# Patient Record
Sex: FEMALE | Race: WHITE | Employment: FULL TIME | URBAN - METROPOLITAN AREA
[De-identification: names, ages, dates, MRNs, and addresses within clinical notes are randomized per-mention and may not be internally consistent; named-entity substitution may affect disease eponyms.]

---

## 2017-05-10 ENCOUNTER — HOSPITAL ENCOUNTER (OUTPATIENT)
Dept: ULTRASOUND IMAGING | Age: 22
Discharge: HOME | End: 2017-05-10
Attending: PEDIATRICS
Payer: COMMERCIAL

## 2017-05-10 DIAGNOSIS — R33.9 URINARY RETENTION: ICD-10-CM

## 2017-05-10 PROCEDURE — 76775 US EXAM ABDO BACK WALL LIM: CPT | Performed by: PEDIATRICS

## 2018-06-13 PROBLEM — F33.9 MAJOR DEPRESSIVE DISORDER, RECURRENT, UNSPECIFIED (HCC): Status: ACTIVE | Noted: 2018-06-13

## 2018-06-13 NOTE — ED NOTES
Nadia Aquino of [facility] now to bedside to evaluate patient.

## 2018-06-13 NOTE — ED INITIAL ASSESSMENT (HPI)
Pt went to see therapist, had no appointment, was not able to see therapist today. Pt produces screen shots on her phone outlining stress with possible new job beginning, death in the family, recent panic attack after move to [place].  +Recent diagnosis of

## 2018-06-14 NOTE — BH LEVEL OF CARE ASSESSMENT
Level of Care Assessment Note    General Questions  Why are you here?: PT IS A PLEASANT 23 YR OLD FEMALE WHO ARRIVES TO THE [facility] VIA AMBULANCE FOR A CRISIS MENTAL HEALTH EVAL. PT REPORTS SUICIDAL THOUGHTS ON A DAILY BAISIS x 5/1/2018.   REPORTS THOUGH HYPERVENTILATING & CRYING.  ENDED UP ON GROUND. TAKEN AWAY ON STRETCHER TO THE ER. AFTER THAT COULD BARELY WORK. KEPT HAVING PANIC ATTACKS.   COULDN'T TOLERATE BEING IN THE AREA IN WHICH SHE HAD THE SEVERE PANIC ATTACK.  1 SUPERVISOR WASN'T SUPPORTIVE @ OF WORTHLESSNESS & THOUGHTS OF IT BEING EASIER IF HE WEREN'T ALIVE. ADMITTED TO THOUGHTS OF \"MAYBE I SHOULD JUST KILL MYSELF\". TOOK AN UBER TO THERAPY APPT TODAY D/T MOM'S CONCERNS ABOUT HER DRIVING. PT W/ A LONG HX OF ANXIETY & SENSORY ISSUES.   EASI months  Score -  OV: 10 - High Risk   Is your experience of thoughts of dying by suicide: Comforting  Protective Factors: SUPPORTIVE FAMILY  Past Suicidal Ideation: Method/Plan  Family History or Personal Lived Experience of Loss or Near Loss by Suicide: Symptoms: Impulsivity; Increased pleasure seeking;Labile  Bipolar Description: RECENT IMPULSIVE SEXUAL ENCOUNTER. IMPULSIVELY DECIDED TO DRINK WINE THIS AFTERNOON. WORRIED ABOUT POSSIBLE BIPOLAR D/O D/T MOOD SWINGS.     Sleep Pattern: Disturbed/interrupted SCREEN + FOR RX'D AMPHETANINE ONLY )    Cannabis Use  Age at first use?: CANNABIS ===  1st / ONLY USE:  2 DAYS AGO  Route: Smoked  Average current amount used? : STATES FRIEND ALLOWED HER TO HAVE ONLY 1 HIT mood  Range of Affect: Normal  Stability of Affect: Stable  Attitude toward staff: Friendly;Pleasant; Co-operative;Open;Warm  Speech  Rate of Speech: Appropriate  Flow of Speech: Appropriate  Intensity of Volume: Ordinary  Clarity: Clear  Cognition  Concent Change  Motivational Stage of Change: Action    Level of Care Recommendations  Consulted with: Kacy Paul MD  Level of Care Recommendation: Inpatient Acute Care  Unit: Adult  Reason for Unit Assigned: A-1 [facility]  Inpatient Criteria: Suicidal/homicidal ris

## 2018-06-14 NOTE — ED PROVIDER NOTES
Patient Seen in: [facility] Emergency Department    History   Patient presents with:   Anxiety/Panic attack (neurologic)    Stated Complaint: anxiety    HPI  CHIEF COMPLAINT: Suicidal ideations    HISTORY OF PRESENT ILLNESS: Patient is a 42-year-old Smokeless tobacco: Never Used                      Alcohol use: No                Review of Systems    Positive for stated complaint: anxiety  Other systems are as noted in HPI. Constitutional and vital signs reviewed.       All other system Positive (*)     All other components within normal limits    Narrative:     Results of the Urine Drug Screen should be used only for medical purposes.    URINALYSIS WITH CULTURE REFLEX - Abnormal; Notable for the following:     Blood Urine Small (*)     Melony Simon

## 2018-06-14 NOTE — ED PROVIDER NOTES
74-year-old female presents to the emergency department secondary to having an involuntary petition completed a outpatient [facility] site and for probable admission. She was seen by me as well as by the EPP for suicidal ideations.   She was medically agustin

## 2018-06-28 ENCOUNTER — OFFICE VISIT (OUTPATIENT)
Dept: FAMILY MEDICINE CLINIC | Facility: CLINIC | Age: 23
End: 2018-06-28

## 2018-06-28 VITALS
HEIGHT: 68.5 IN | SYSTOLIC BLOOD PRESSURE: 122 MMHG | OXYGEN SATURATION: 98 % | RESPIRATION RATE: 16 BRPM | BODY MASS INDEX: 25.39 KG/M2 | WEIGHT: 169.5 LBS | HEART RATE: 93 BPM | DIASTOLIC BLOOD PRESSURE: 70 MMHG | TEMPERATURE: 98 F

## 2018-06-28 DIAGNOSIS — D64.9 ANEMIA, UNSPECIFIED TYPE: ICD-10-CM

## 2018-06-28 DIAGNOSIS — M54.6 ACUTE BILATERAL THORACIC BACK PAIN: ICD-10-CM

## 2018-06-28 DIAGNOSIS — F33.1 MODERATE EPISODE OF RECURRENT MAJOR DEPRESSIVE DISORDER (HCC): ICD-10-CM

## 2018-06-28 DIAGNOSIS — R53.83 FATIGUE, UNSPECIFIED TYPE: ICD-10-CM

## 2018-06-28 DIAGNOSIS — Z76.89 ESTABLISHING CARE WITH NEW DOCTOR, ENCOUNTER FOR: ICD-10-CM

## 2018-06-28 DIAGNOSIS — Z13.21 ENCOUNTER FOR VITAMIN DEFICIENCY SCREENING: ICD-10-CM

## 2018-06-28 DIAGNOSIS — R11.2 NAUSEA AND VOMITING, INTRACTABILITY OF VOMITING NOT SPECIFIED, UNSPECIFIED VOMITING TYPE: Primary | ICD-10-CM

## 2018-06-28 DIAGNOSIS — K21.9 GASTROESOPHAGEAL REFLUX DISEASE, ESOPHAGITIS PRESENCE NOT SPECIFIED: ICD-10-CM

## 2018-06-28 PROCEDURE — 83550 IRON BINDING TEST: CPT | Performed by: FAMILY MEDICINE

## 2018-06-28 PROCEDURE — 36415 COLL VENOUS BLD VENIPUNCTURE: CPT | Performed by: FAMILY MEDICINE

## 2018-06-28 PROCEDURE — 83540 ASSAY OF IRON: CPT | Performed by: FAMILY MEDICINE

## 2018-06-28 PROCEDURE — 84443 ASSAY THYROID STIM HORMONE: CPT | Performed by: FAMILY MEDICINE

## 2018-06-28 PROCEDURE — 82728 ASSAY OF FERRITIN: CPT | Performed by: FAMILY MEDICINE

## 2018-06-28 PROCEDURE — 82607 VITAMIN B-12: CPT | Performed by: FAMILY MEDICINE

## 2018-06-28 PROCEDURE — 99204 OFFICE O/P NEW MOD 45 MIN: CPT | Performed by: FAMILY MEDICINE

## 2018-06-28 PROCEDURE — 82306 VITAMIN D 25 HYDROXY: CPT | Performed by: FAMILY MEDICINE

## 2018-06-28 RX ORDER — ONDANSETRON 4 MG/1
4 TABLET, FILM COATED ORAL EVERY 8 HOURS PRN
Qty: 20 TABLET | Refills: 0 | Status: SHIPPED | OUTPATIENT
Start: 2018-06-28 | End: 2018-08-01 | Stop reason: ALTCHOICE

## 2018-06-28 NOTE — PROGRESS NOTES
HPI:    Patient ID: Janneth Dia is a 26year old female. HPI   23yr old female presents as a new patient with c/o n/v, f/u on anemia, and back pain. C/o n/v x 2d, felt nauseated all day on Tues. , 6/26 and had an episode of nb, nb emesis yesterday. Melatonin 5 MG Oral Tab Take 5 mg by mouth nightly as needed. Disp:  Rfl:    Ibuprofen (MIDOL) 200 MG Oral Cap Take 600 mg by mouth 2 (two) times daily as needed.  Disp:  Rfl:    Norethin Ace-Eth Estrad-FE 1-20 MG-MCG Oral Tab Take 1 tablet by mouth daily disorder ([facility]  - denies anhedonia, SI/HI  - cpm  - cont outpatient day program at [facility]  - monitor for any relapse in symptoms    4. Fatigue, unspecified  5. Anemia, unspecified type  - check iron studies    6.  Acute bilateral thoracic back pain  - advised

## 2018-07-02 ENCOUNTER — TELEPHONE (OUTPATIENT)
Dept: FAMILY MEDICINE CLINIC | Facility: CLINIC | Age: 23
End: 2018-07-02

## 2018-07-02 NOTE — TELEPHONE ENCOUNTER
----- Message from [facility], DO sent at 7/1/2018 11:03 PM CDT -----  Pls inform pt that labs all nl except continued low vit d levels. Advise to take vit d3 2000u daily otc.

## 2018-08-01 ENCOUNTER — OFFICE VISIT (OUTPATIENT)
Dept: FAMILY MEDICINE CLINIC | Facility: CLINIC | Age: 23
End: 2018-08-01
Payer: COMMERCIAL

## 2018-08-01 VITALS
OXYGEN SATURATION: 98 % | DIASTOLIC BLOOD PRESSURE: 70 MMHG | RESPIRATION RATE: 16 BRPM | HEIGHT: 68.5 IN | HEART RATE: 73 BPM | BODY MASS INDEX: 26.41 KG/M2 | SYSTOLIC BLOOD PRESSURE: 122 MMHG | TEMPERATURE: 98 F | WEIGHT: 176.25 LBS

## 2018-08-01 DIAGNOSIS — K29.00 ACUTE GASTRITIS WITHOUT HEMORRHAGE, UNSPECIFIED GASTRITIS TYPE: ICD-10-CM

## 2018-08-01 DIAGNOSIS — K21.9 GASTROESOPHAGEAL REFLUX DISEASE, ESOPHAGITIS PRESENCE NOT SPECIFIED: Primary | ICD-10-CM

## 2018-08-01 DIAGNOSIS — F33.9 EPISODE OF RECURRENT MAJOR DEPRESSIVE DISORDER, UNSPECIFIED DEPRESSION EPISODE SEVERITY (HCC): ICD-10-CM

## 2018-08-01 DIAGNOSIS — F41.1 GENERALIZED ANXIETY DISORDER: ICD-10-CM

## 2018-08-01 PROBLEM — J02.0 STREPTOCOCCAL SORE THROAT: Status: ACTIVE | Noted: 2017-09-11

## 2018-08-01 PROCEDURE — 99214 OFFICE O/P EST MOD 30 MIN: CPT | Performed by: FAMILY MEDICINE

## 2018-08-01 RX ORDER — CLINDAMYCIN AND BENZOYL PEROXIDE 10; 50 MG/G; MG/G
GEL TOPICAL
COMMUNITY
Start: 2010-09-27 | End: 2018-08-20 | Stop reason: ALTCHOICE

## 2018-08-01 RX ORDER — OMEPRAZOLE 40 MG/1
40 CAPSULE, DELAYED RELEASE ORAL
Qty: 30 CAPSULE | Refills: 0 | Status: SHIPPED | OUTPATIENT
Start: 2018-08-01 | End: 2018-08-20 | Stop reason: ALTCHOICE

## 2018-08-01 RX ORDER — BUPROPION HYDROCHLORIDE 150 MG/1
300 TABLET ORAL DAILY
Status: ON HOLD | COMMUNITY
Start: 2018-07-31 | End: 2019-08-29

## 2018-08-01 NOTE — PROGRESS NOTES
HPI:    Patient ID: Brock Miller is a 26year old female. HPI   23yr old female presents with c/o epigastric abdominal pain and heartburn since last night. States her sister put Farshad Enriquez in her coffee last night as a prank and she drank it.  Has been ha Rfl: 0   ClonazePAM 0.5 MG Oral Tab Take 0.5 mg by mouth 2 (two) times daily. Disp:  Rfl:    ClonazePAM 0.5 MG Oral Tab Take 0.5 mg by mouth daily as needed for Anxiety.  Disp:  Rfl:    Ibuprofen (MIDOL) 200 MG Oral Cap Take 600 mg by mouth 2 (two) times (40 mg total) by mouth every morning before breakfast.  Dispense: 30 capsule; Refill: 0    3. Episode of recurrent major depressive disorder, unspecified depression episode severity ([facility]  4.  Generalized anxiety disorder  - following with psychiatrist and

## 2018-08-03 PROCEDURE — 88175 CYTOPATH C/V AUTO FLUID REDO: CPT | Performed by: OBSTETRICS & GYNECOLOGY

## 2018-08-19 ENCOUNTER — HOSPITAL ENCOUNTER (EMERGENCY)
Facility: HOSPITAL | Age: 23
Discharge: HOME | End: 2018-08-19
Attending: EMERGENCY MEDICINE
Payer: COMMERCIAL

## 2018-08-19 ENCOUNTER — APPOINTMENT (OUTPATIENT)
Dept: CT IMAGING | Facility: HOSPITAL | Age: 23
End: 2018-08-19
Attending: EMERGENCY MEDICINE
Payer: COMMERCIAL

## 2018-08-19 VITALS
HEART RATE: 77 BPM | DIASTOLIC BLOOD PRESSURE: 79 MMHG | WEIGHT: 175 LBS | HEIGHT: 67 IN | OXYGEN SATURATION: 98 % | SYSTOLIC BLOOD PRESSURE: 115 MMHG | TEMPERATURE: 98 F | RESPIRATION RATE: 18 BRPM | BODY MASS INDEX: 27.47 KG/M2

## 2018-08-19 DIAGNOSIS — R10.9 ABDOMINAL PAIN, RIGHT LATERAL: Primary | ICD-10-CM

## 2018-08-19 LAB
ALBUMIN SERPL-MCNC: 3.7 G/DL (ref 3.5–4.8)
ALBUMIN/GLOB SERPL: 0.9 {RATIO} (ref 1–2)
ALP LIVER SERPL-CCNC: 86 U/L (ref 52–144)
ALT SERPL-CCNC: 25 U/L (ref 14–54)
ANION GAP SERPL CALC-SCNC: 8 MMOL/L (ref 0–18)
AST SERPL-CCNC: 15 U/L (ref 15–41)
BASOPHILS # BLD AUTO: 0.02 X10(3) UL (ref 0–0.1)
BASOPHILS NFR BLD AUTO: 0.3 %
BILIRUB SERPL-MCNC: 0.2 MG/DL (ref 0.1–2)
BILIRUB UR QL STRIP.AUTO: NEGATIVE
BUN BLD-MCNC: 10 MG/DL (ref 8–20)
BUN/CREAT SERPL: 14.5 (ref 10–20)
CALCIUM BLD-MCNC: 8.5 MG/DL (ref 8.3–10.3)
CHLORIDE SERPL-SCNC: 108 MMOL/L (ref 101–111)
CLARITY UR REFRACT.AUTO: CLEAR
CO2 SERPL-SCNC: 23 MMOL/L (ref 22–32)
COLOR UR AUTO: YELLOW
CREAT BLD-MCNC: 0.69 MG/DL (ref 0.55–1.02)
EOSINOPHIL # BLD AUTO: 0.13 X10(3) UL (ref 0–0.3)
EOSINOPHIL NFR BLD AUTO: 2.1 %
ERYTHROCYTE [DISTWIDTH] IN BLOOD BY AUTOMATED COUNT: 12.1 % (ref 11.5–16)
GLOBULIN PLAS-MCNC: 4 G/DL (ref 2.5–4)
GLUCOSE BLD-MCNC: 91 MG/DL (ref 70–99)
GLUCOSE UR STRIP.AUTO-MCNC: NEGATIVE MG/DL
HCT VFR BLD AUTO: 36.7 % (ref 34–50)
HGB BLD-MCNC: 12.5 G/DL (ref 12–16)
IMMATURE GRANULOCYTE COUNT: 0.01 X10(3) UL (ref 0–1)
IMMATURE GRANULOCYTE RATIO %: 0.2 %
KETONES UR STRIP.AUTO-MCNC: NEGATIVE MG/DL
LYMPHOCYTES # BLD AUTO: 2.11 X10(3) UL (ref 0.9–4)
LYMPHOCYTES NFR BLD AUTO: 33.9 %
M PROTEIN MFR SERPL ELPH: 7.7 G/DL (ref 6.1–8.3)
MCH RBC QN AUTO: 31.9 PG (ref 27–33.2)
MCHC RBC AUTO-ENTMCNC: 34.1 G/DL (ref 31–37)
MCV RBC AUTO: 93.6 FL (ref 81–100)
MONOCYTES # BLD AUTO: 0.71 X10(3) UL (ref 0.1–1)
MONOCYTES NFR BLD AUTO: 11.4 %
NEUTROPHIL ABS PRELIM: 3.24 X10 (3) UL (ref 1.3–6.7)
NEUTROPHILS # BLD AUTO: 3.24 X10(3) UL (ref 1.3–6.7)
NEUTROPHILS NFR BLD AUTO: 52.1 %
NITRITE UR QL STRIP.AUTO: NEGATIVE
OSMOLALITY SERPL CALC.SUM OF ELEC: 287 MOSM/KG (ref 275–295)
PH UR STRIP.AUTO: 6 [PH] (ref 4.5–8)
PLATELET # BLD AUTO: 298 10(3)UL (ref 150–450)
POTASSIUM SERPL-SCNC: 4.1 MMOL/L (ref 3.6–5.1)
PROT UR STRIP.AUTO-MCNC: NEGATIVE MG/DL
RBC # BLD AUTO: 3.92 X10(6)UL (ref 3.8–5.1)
RBC #/AREA URNS AUTO: >10 /HPF
RED CELL DISTRIBUTION WIDTH-SD: 41.4 FL (ref 35.1–46.3)
SODIUM SERPL-SCNC: 139 MMOL/L (ref 136–144)
SP GR UR STRIP.AUTO: 1.02 (ref 1–1.03)
UROBILINOGEN UR STRIP.AUTO-MCNC: 2 MG/DL
WBC # BLD AUTO: 6.2 X10(3) UL (ref 4–13)

## 2018-08-19 PROCEDURE — 87086 URINE CULTURE/COLONY COUNT: CPT | Performed by: EMERGENCY MEDICINE

## 2018-08-19 PROCEDURE — 99284 EMERGENCY DEPT VISIT MOD MDM: CPT | Performed by: EMERGENCY MEDICINE

## 2018-08-19 PROCEDURE — 96360 HYDRATION IV INFUSION INIT: CPT | Performed by: EMERGENCY MEDICINE

## 2018-08-19 PROCEDURE — 85025 COMPLETE CBC W/AUTO DIFF WBC: CPT | Performed by: EMERGENCY MEDICINE

## 2018-08-19 PROCEDURE — 81001 URINALYSIS AUTO W/SCOPE: CPT | Performed by: EMERGENCY MEDICINE

## 2018-08-19 PROCEDURE — 74177 CT ABD & PELVIS W/CONTRAST: CPT | Performed by: EMERGENCY MEDICINE

## 2018-08-19 PROCEDURE — 80053 COMPREHEN METABOLIC PANEL: CPT | Performed by: EMERGENCY MEDICINE

## 2018-08-20 ENCOUNTER — OFFICE VISIT (OUTPATIENT)
Dept: SURGERY | Facility: CLINIC | Age: 23
End: 2018-08-20
Payer: COMMERCIAL

## 2018-08-20 VITALS
SYSTOLIC BLOOD PRESSURE: 106 MMHG | RESPIRATION RATE: 20 BRPM | WEIGHT: 175 LBS | HEART RATE: 96 BPM | DIASTOLIC BLOOD PRESSURE: 70 MMHG | BODY MASS INDEX: 27.15 KG/M2 | HEIGHT: 67.5 IN | TEMPERATURE: 98 F

## 2018-08-20 DIAGNOSIS — R10.9 ABDOMINAL PAIN, UNSPECIFIED ABDOMINAL LOCATION: Primary | ICD-10-CM

## 2018-08-20 DIAGNOSIS — R59.0 LYMPHADENOPATHY, MESENTERIC: ICD-10-CM

## 2018-08-20 PROCEDURE — 99204 OFFICE O/P NEW MOD 45 MIN: CPT | Performed by: COLON & RECTAL SURGERY

## 2018-08-20 RX ORDER — CLONAZEPAM 1 MG/1
1 TABLET ORAL 2 TIMES DAILY PRN
Status: ON HOLD | COMMUNITY
Start: 2018-07-31 | End: 2019-08-29

## 2018-08-20 NOTE — H&P
New Patient Visit Note       Active Problems      1. Abdominal pain, unspecified abdominal location    2.  Lymphadenopathy, mesenteric        Chief Complaint   Abdominal pain    History of Present Illness   Sue Palma is a 26year old female who present Surgical History:  1999: ADENOIDECTOMY    The family history and social history have been reviewed by me today.     Family History   Problem Relation Age of Onset   • Breast Cancer Mother 39     BRCA negative   • Breast Cancer Paternal Grandmother    • Lung for chest pain, palpitations and leg swelling. Gastrointestinal: Positive for abdominal pain and nausea. Negative for abdominal distention, anal bleeding, blood in stool, constipation, diarrhea and vomiting.    Genitourinary: Positive for difficulty urina Psychiatric: She has a normal mood and affect. Her behavior is normal.        Lab Results  Component Value Date   WBC 6.2 08/19/2018   RBC 3.92 08/19/2018   HGB 12.5 08/19/2018   HCT 36.7 08/19/2018   MCV 93.6 08/19/2018   MCH 31.9 08/19/2018   MCHC 34. 1 changes. Mild right-sided lymphadenopathy is noted. This may be due to an early appendicitis. This critical result was discussed with Dr. Rose Marie Canada at 2113 hr on 8/19/2018. Read back was performed.      Dictated by: Laura Daniels MD on 8/19/2018 at 21:10 this will see if it is the constipation causing pain. Patient stop MiraLAX when she starts having looser stools. Patient can use Tylenol, Advil, or Aleve for pain control.     If the patient begins having fever, severe constant pain, she should notify t

## 2018-08-20 NOTE — PATIENT INSTRUCTIONS
Assessment   Abdominal pain, unspecified abdominal location  (primary encounter diagnosis)  Lymphadenopathy, mesenteric      Plan   The CT scan of the abdomen and pelvis was personally reviewed. I agree with the radiologist's interpretation.   The appendix appendicitis. Otherwise the patient should follow-up in 1 week. If her pain is persistent at that point, we may consider a diagnostic laparoscopy.

## 2018-08-20 NOTE — ED PROVIDER NOTES
Patient Seen in: [facility] Emergency Department    History   Patient presents with:  Abdomen/Flank Pain (GI/)    Stated Complaint: abd pain    HPI    82-year-old female presents emergency department who went to urgent care and was told to come to pharynx  Neck: Supple no JVD no lymphadenopathy no meningismus no carotid bruit  CV: Regular rate and rhythm no murmur rub  Respiratory: Clear to auscultation bilaterally no crackles no wheezes no accessory muscle use  Abdomen: Very mild right upper quadra appendicitis. This critical result was discussed with Dr. Alma Howell at 2113 hr on 8/19/2018. Read back was performed.      Dictated by: Gabby Robert MD on 8/19/2018 at 21:10     Approved by: Gabby Robert MD          I discussed case with surgery and

## 2018-08-21 ENCOUNTER — TELEPHONE (OUTPATIENT)
Dept: SURGERY | Facility: CLINIC | Age: 23
End: 2018-08-21

## 2018-08-21 NOTE — TELEPHONE ENCOUNTER
Patient's mother called to report that she's having RLQ abdominal paIn 8/10. She is afebrile. She works this morning and pain returned at around 12 pm. She is having some nausea, but no emesis. She had a hard BM yesterday and has not started Miralax.

## 2018-08-27 ENCOUNTER — OFFICE VISIT (OUTPATIENT)
Dept: SURGERY | Facility: CLINIC | Age: 23
End: 2018-08-27
Payer: COMMERCIAL

## 2018-08-27 VITALS
SYSTOLIC BLOOD PRESSURE: 106 MMHG | DIASTOLIC BLOOD PRESSURE: 70 MMHG | HEART RATE: 66 BPM | HEIGHT: 68 IN | BODY MASS INDEX: 26.52 KG/M2 | WEIGHT: 175 LBS

## 2018-08-27 DIAGNOSIS — R10.9 ABDOMINAL PAIN, UNSPECIFIED ABDOMINAL LOCATION: Primary | ICD-10-CM

## 2018-08-27 PROCEDURE — 99213 OFFICE O/P EST LOW 20 MIN: CPT | Performed by: COLON & RECTAL SURGERY

## 2018-08-27 NOTE — PATIENT INSTRUCTIONS
Assessment   Abdominal pain, unspecified abdominal location  (primary encounter diagnosis)    Plan   At this point it is still unclear what was the true source the patient's abdominal pain.   However it is very unlikely that this is associated with appendic

## 2018-08-27 NOTE — PROGRESS NOTES
Follow Up Visit Note       Active Problems      1.  Abdominal pain, unspecified abdominal location          Chief Complaint   Improved but mild abdominal pain      History of Present Illness  Eladia Heard is a 26year old female who presents today for fol days, but no actual pain with urination. Patient states she has never had a similar episode. She denies any fever, though has felt cold for the last few days.     Medical comorbidities include anxiety, depression, autism, posttraumatic stress disorder. Tablet 24 Hr  Disp:  Rfl:    TraZODone HCl 50 MG Oral Tab Take 1.5 tablets (75 mg total) by mouth nightly. Disp: 45 tablet Rfl: 0   escitalopram 20 MG Oral Tab Take 1 tablet (20 mg total) by mouth daily.  (Patient taking differently: Take 40 mg by mouth rajwinder round, and reactive to light. No scleral icterus. Neck: Normal range of motion. Cardiovascular: Normal rate and regular rhythm. Pulmonary/Chest: Breath sounds normal. No respiratory distress. She has no wheezes. Abdominal:   Soft, nondistended.   Sharilyn Butter Follow-up on file.     Liam Hickey MD

## 2018-08-28 DIAGNOSIS — K29.00 ACUTE GASTRITIS WITHOUT HEMORRHAGE, UNSPECIFIED GASTRITIS TYPE: ICD-10-CM

## 2018-08-28 DIAGNOSIS — K21.9 GASTROESOPHAGEAL REFLUX DISEASE, ESOPHAGITIS PRESENCE NOT SPECIFIED: ICD-10-CM

## 2018-08-29 RX ORDER — OMEPRAZOLE 40 MG/1
CAPSULE, DELAYED RELEASE ORAL
Qty: 30 CAPSULE | Refills: 0 | Status: SHIPPED | OUTPATIENT
Start: 2018-08-29 | End: 2018-10-22 | Stop reason: ALTCHOICE

## 2018-08-29 NOTE — TELEPHONE ENCOUNTER
LOV   8/1/18    Gen Surg visit 8/27/18    LAST LAB 8/19/18    LAST RX 8/1/18  #30  No refill    Next OV No future appointments.     PROTOCOL  OMEPRAZOLE DR 40 MG CAPSULE  Last refill: 8/1/2018  Omeprazole 40 MG Oral Capsule Delayed Release (Discontinued) 30

## 2018-10-22 ENCOUNTER — OFFICE VISIT (OUTPATIENT)
Dept: FAMILY MEDICINE CLINIC | Facility: CLINIC | Age: 23
End: 2018-10-22
Payer: COMMERCIAL

## 2018-10-22 VITALS
HEIGHT: 68 IN | DIASTOLIC BLOOD PRESSURE: 68 MMHG | SYSTOLIC BLOOD PRESSURE: 106 MMHG | TEMPERATURE: 98 F | HEART RATE: 84 BPM | RESPIRATION RATE: 18 BRPM | OXYGEN SATURATION: 99 % | BODY MASS INDEX: 26.07 KG/M2 | WEIGHT: 172 LBS

## 2018-10-22 DIAGNOSIS — J01.91 ACUTE RECURRENT SINUSITIS, UNSPECIFIED LOCATION: Primary | ICD-10-CM

## 2018-10-22 DIAGNOSIS — K29.00 ACUTE GASTRITIS WITHOUT HEMORRHAGE, UNSPECIFIED GASTRITIS TYPE: ICD-10-CM

## 2018-10-22 DIAGNOSIS — R05.9 COUGH: ICD-10-CM

## 2018-10-22 DIAGNOSIS — K21.9 GASTROESOPHAGEAL REFLUX DISEASE, ESOPHAGITIS PRESENCE NOT SPECIFIED: ICD-10-CM

## 2018-10-22 PROCEDURE — 99214 OFFICE O/P EST MOD 30 MIN: CPT | Performed by: FAMILY MEDICINE

## 2018-10-22 RX ORDER — FLUTICASONE PROPIONATE 50 MCG
2 SPRAY, SUSPENSION (ML) NASAL DAILY
Qty: 1 BOTTLE | Refills: 1 | Status: SHIPPED | OUTPATIENT
Start: 2018-10-22 | End: 2018-12-31 | Stop reason: ALTCHOICE

## 2018-10-22 RX ORDER — AMOXICILLIN 875 MG/1
875 TABLET, COATED ORAL 2 TIMES DAILY
Qty: 20 TABLET | Refills: 0 | Status: SHIPPED | OUTPATIENT
Start: 2018-10-22 | End: 2018-11-01

## 2018-10-22 RX ORDER — ALBUTEROL SULFATE 90 UG/1
2 AEROSOL, METERED RESPIRATORY (INHALATION) EVERY 6 HOURS PRN
Qty: 1 INHALER | Refills: 0 | Status: SHIPPED | OUTPATIENT
Start: 2018-10-22 | End: 2019-06-12 | Stop reason: ALTCHOICE

## 2018-10-22 NOTE — PROGRESS NOTES
HPI:   Juan Santiago is a 26year old female who presents for upper respiratory symptoms for the past few weeks. Symptoms began with allergies about 3.5wks ago. C/o dry cough, clear rhinorrhea, itchy eyes, frontal headaches and congestion.  Had taken zyrte BRCA negative   • Breast Cancer Paternal Grandmother    • Lung Disorder Paternal Grandmother    • Colon Cancer Other         MGGM   • Other (Oral Cancer) Maternal Grandfather    • Diabetes Maternal Grandmother    • High Cholesterol Maternal Grandmother Wheezing or Shortness of Breath (cough). Dispense: 1 Inhaler; Refill: 0      The patient indicates understanding of these issues and agrees to the plan. The patient is asked to return if sx's persist or worsen.

## 2018-10-23 RX ORDER — OMEPRAZOLE 40 MG/1
CAPSULE, DELAYED RELEASE ORAL
Qty: 30 CAPSULE | Refills: 0 | Status: SHIPPED | OUTPATIENT
Start: 2018-10-23 | End: 2018-12-31 | Stop reason: ALTCHOICE

## 2018-10-23 NOTE — TELEPHONE ENCOUNTER
LOV yesterday    LAST LAB    LAST RX   OMEPRAZOLE 40 MG Oral Capsule Delayed Release (Discontinued) 30 capsule 0 8/29/2018 10/         Next OV    PROTOCOL  Please advise. No protocol. If filled. Please close.    Thank You

## 2018-12-20 PROBLEM — A74.9 CHLAMYDIA: Status: ACTIVE | Noted: 2018-12-20

## 2018-12-20 PROBLEM — A54.9 GONORRHEA: Status: ACTIVE | Noted: 2018-12-20

## 2018-12-31 ENCOUNTER — APPOINTMENT (OUTPATIENT)
Dept: CT IMAGING | Age: 23
End: 2018-12-31
Attending: FAMILY MEDICINE
Payer: COMMERCIAL

## 2018-12-31 ENCOUNTER — APPOINTMENT (OUTPATIENT)
Dept: ULTRASOUND IMAGING | Age: 23
End: 2018-12-31
Attending: FAMILY MEDICINE
Payer: COMMERCIAL

## 2018-12-31 ENCOUNTER — HOSPITAL ENCOUNTER (OUTPATIENT)
Age: 23
Discharge: HOME | End: 2018-12-31
Attending: FAMILY MEDICINE
Payer: COMMERCIAL

## 2018-12-31 VITALS
BODY MASS INDEX: 26.22 KG/M2 | SYSTOLIC BLOOD PRESSURE: 117 MMHG | DIASTOLIC BLOOD PRESSURE: 69 MMHG | WEIGHT: 173 LBS | HEIGHT: 68 IN | OXYGEN SATURATION: 99 % | RESPIRATION RATE: 18 BRPM | HEART RATE: 78 BPM | TEMPERATURE: 98 F

## 2018-12-31 DIAGNOSIS — N83.201 CYST OF RIGHT OVARY: Primary | ICD-10-CM

## 2018-12-31 DIAGNOSIS — N73.0 PID (ACUTE PELVIC INFLAMMATORY DISEASE): ICD-10-CM

## 2018-12-31 DIAGNOSIS — N20.0 NEPHROLITHIASIS: ICD-10-CM

## 2018-12-31 LAB
#LYMPHOCYTE IC: 2 X10ˆ3/UL (ref 0.9–3.2)
#MXD IC: 0.4 X10ˆ3/UL (ref 0.1–1)
#NEUTROPHIL IC: 1.5 X10ˆ3/UL (ref 1.3–6.7)
CREAT SERPL-MCNC: 0.6 MG/DL (ref 0.55–1.02)
GLUCOSE BLD-MCNC: 85 MG/DL (ref 70–99)
HCT IC: 34.5 % (ref 37–54)
HGB IC: 11.8 G/DL (ref 12–16)
ISTAT BUN: 4 MG/DL (ref 8–20)
ISTAT CHLORIDE: 103 MMOL/L (ref 101–111)
ISTAT HEMATOCRIT: 34 % (ref 34–50)
ISTAT IONIZED CALCIUM: 1.22 MMOL/L
ISTAT POTASSIUM: 3.9 MMOL/L (ref 3.6–5.1)
ISTAT SODIUM: 139 MMOL/L (ref 136–144)
LYMPHOCYTES NFR BLD AUTO: 51.1 %
MCH IC: 31.3 PG (ref 27–33.2)
MCHC IC: 34.2 G/DL (ref 31–37)
MCV IC: 91.5 FL (ref 81–100)
MIXED CELL %: 10.6 %
NEUTROPHILS NFR BLD AUTO: 38.3 %
PLT IC: 287 X10ˆ3/UL (ref 150–450)
POCT BILIRUBIN URINE: NEGATIVE
POCT GLUCOSE URINE: NEGATIVE MG/DL
POCT KETONE URINE: NEGATIVE MG/DL
POCT LEUKOCYTE ESTERASE URINE: NEGATIVE
POCT NITRITE URINE: NEGATIVE
POCT PH URINE: 6.5 (ref 5–8)
POCT PROTEIN URINE: NEGATIVE MG/DL
POCT SPECIFIC GRAVITY URINE: 1.02
POCT URINE CLARITY: CLEAR
POCT URINE COLOR: YELLOW
POCT URINE PREGNANCY: NEGATIVE
POCT UROBILINOGEN URINE: 0.2 MG/DL
RBC IC: 3.77 X10ˆ6/UL (ref 3.8–5.1)
WBC IC: 3.9 X10ˆ3/UL (ref 4–13)

## 2018-12-31 PROCEDURE — 76830 TRANSVAGINAL US NON-OB: CPT | Performed by: FAMILY MEDICINE

## 2018-12-31 PROCEDURE — 74177 CT ABD & PELVIS W/CONTRAST: CPT | Performed by: FAMILY MEDICINE

## 2018-12-31 PROCEDURE — 96361 HYDRATE IV INFUSION ADD-ON: CPT

## 2018-12-31 PROCEDURE — 81025 URINE PREGNANCY TEST: CPT | Performed by: FAMILY MEDICINE

## 2018-12-31 PROCEDURE — 99215 OFFICE O/P EST HI 40 MIN: CPT

## 2018-12-31 PROCEDURE — 80047 BASIC METABLC PNL IONIZED CA: CPT

## 2018-12-31 PROCEDURE — 96360 HYDRATION IV INFUSION INIT: CPT

## 2018-12-31 PROCEDURE — 76856 US EXAM PELVIC COMPLETE: CPT | Performed by: FAMILY MEDICINE

## 2018-12-31 PROCEDURE — 85025 COMPLETE CBC W/AUTO DIFF WBC: CPT | Performed by: FAMILY MEDICINE

## 2018-12-31 PROCEDURE — 99204 OFFICE O/P NEW MOD 45 MIN: CPT

## 2018-12-31 PROCEDURE — 81002 URINALYSIS NONAUTO W/O SCOPE: CPT | Performed by: FAMILY MEDICINE

## 2018-12-31 RX ORDER — METRONIDAZOLE 500 MG/1
500 TABLET ORAL 3 TIMES DAILY
Qty: 30 TABLET | Refills: 0 | Status: SHIPPED | OUTPATIENT
Start: 2018-12-31 | End: 2019-01-10

## 2018-12-31 RX ORDER — DOXYCYCLINE 100 MG/1
100 CAPSULE ORAL 2 TIMES DAILY
Qty: 20 CAPSULE | Refills: 0 | Status: SHIPPED | OUTPATIENT
Start: 2018-12-31 | End: 2019-01-10

## 2018-12-31 RX ORDER — DICYCLOMINE HCL 20 MG
20 TABLET ORAL 4 TIMES DAILY PRN
Qty: 28 TABLET | Refills: 0 | Status: SHIPPED | OUTPATIENT
Start: 2018-12-31 | End: 2019-01-07

## 2018-12-31 RX ORDER — SODIUM CHLORIDE 9 MG/ML
1000 INJECTION, SOLUTION INTRAVENOUS ONCE
Status: COMPLETED | OUTPATIENT
Start: 2018-12-31 | End: 2018-12-31

## 2018-12-31 RX ORDER — ONDANSETRON 4 MG/1
4 TABLET, ORALLY DISINTEGRATING ORAL EVERY 6 HOURS PRN
Qty: 20 TABLET | Refills: 0 | Status: SHIPPED | OUTPATIENT
Start: 2018-12-31 | End: 2019-01-05

## 2018-12-31 NOTE — ED INITIAL ASSESSMENT (HPI)
Treated for Chlamydia and Gonorrhea 12/26. Was seen by PCP, diagnosed PID.  (IUD placed 12/17)  Pt c/o abdominal cramping taking midol. States bloody vaginal discharge since IUD placement.

## 2018-12-31 NOTE — ED PROVIDER NOTES
Patient Seen in: [facility]    History   Patient presents with:  Abdominal Pain    Stated Complaint: Abdominal pain 2 wks    HPI  This is a young lady, 21-year-old Bertha, coming in with abdominal pain, cramping that on and off ED Triage Vitals [12/31/18 0979]   /74   Pulse 77   Resp 20   Temp 98 °F (36.7 °C)   Temp src Temporal   SpO2 98 %   O2 Device None (Room air)       Current:/69   Pulse 78   Temp 98 °F (36.7 °C) (Temporal)   Resp 18   Ht 172.7 cm (5' 8\") by PCP, diagnosed               PID.              (IUD placed 12/17)              Pt c/o abdominal cramping taking midol. States bloody vaginal discharge               since IUD placement.                         Order Specific Question: What is the Creig Ni Technologist)  Patient states she had an IUD placed two weeks ago and has been having lower abdominal pain, pelvic pain and was recently diagnosed with Pelvic inflammatory disease.    CONTRAST USED:  100cc of Omnipaque 350  FINDINGS: LUNG BASE:  Minimal ate endovaginal technique. Transvaginal ultrasound was used to better evaluate adnexal and endometrial detail. PATIENT STATED HISTORY: (As transcribed by Technologist)  Patient has pelvic pain.     FINDINGS:            UTERUS:  6.80 cm x 2.78 cm x 4.32 cm   E from the R sided ovarian cyst as well. Rx Zofran 4 mg every 6 hours for nausea / vomiting. If any worsening to go to the ER immediately. Follow up with Dr Ant Gilbert in 3-5 days for reassessment of symptoms.                Disposition and Plan     Cli

## 2019-01-06 ENCOUNTER — HOSPITAL ENCOUNTER (OUTPATIENT)
Age: 24
Discharge: HOME | End: 2019-01-06
Attending: FAMILY MEDICINE
Payer: COMMERCIAL

## 2019-01-06 VITALS
OXYGEN SATURATION: 99 % | RESPIRATION RATE: 20 BRPM | HEART RATE: 90 BPM | WEIGHT: 173 LBS | SYSTOLIC BLOOD PRESSURE: 109 MMHG | BODY MASS INDEX: 26.22 KG/M2 | DIASTOLIC BLOOD PRESSURE: 61 MMHG | HEIGHT: 68 IN | TEMPERATURE: 98 F

## 2019-01-06 DIAGNOSIS — N30.01 ACUTE CYSTITIS WITH HEMATURIA: Primary | ICD-10-CM

## 2019-01-06 LAB
POCT BILIRUBIN URINE: NEGATIVE
POCT GLUCOSE URINE: NEGATIVE MG/DL
POCT NITRITE URINE: NEGATIVE
POCT PH URINE: 5.5 (ref 5–8)
POCT PROTEIN URINE: NEGATIVE MG/DL
POCT SPECIFIC GRAVITY URINE: 1.03
POCT URINE CLARITY: CLEAR
POCT URINE COLOR: YELLOW
POCT URINE PREGNANCY: NEGATIVE
POCT UROBILINOGEN URINE: 0.2 MG/DL

## 2019-01-06 PROCEDURE — 99213 OFFICE O/P EST LOW 20 MIN: CPT

## 2019-01-06 PROCEDURE — 87086 URINE CULTURE/COLONY COUNT: CPT | Performed by: FAMILY MEDICINE

## 2019-01-06 PROCEDURE — 81025 URINE PREGNANCY TEST: CPT | Performed by: FAMILY MEDICINE

## 2019-01-06 PROCEDURE — 99214 OFFICE O/P EST MOD 30 MIN: CPT

## 2019-01-06 PROCEDURE — 81002 URINALYSIS NONAUTO W/O SCOPE: CPT | Performed by: FAMILY MEDICINE

## 2019-01-06 RX ORDER — NITROFURANTOIN 25; 75 MG/1; MG/1
100 CAPSULE ORAL 2 TIMES DAILY
Qty: 10 CAPSULE | Refills: 0 | Status: SHIPPED | OUTPATIENT
Start: 2019-01-06 | End: 2019-01-11

## 2019-01-06 RX ORDER — PHENAZOPYRIDINE HYDROCHLORIDE 200 MG/1
200 TABLET, FILM COATED ORAL 3 TIMES DAILY PRN
Qty: 6 TABLET | Refills: 0 | Status: SHIPPED | OUTPATIENT
Start: 2019-01-06 | End: 2019-01-08

## 2019-01-06 RX ORDER — PHENAZOPYRIDINE HYDROCHLORIDE 200 MG/1
200 TABLET, FILM COATED ORAL ONCE
Status: COMPLETED | OUTPATIENT
Start: 2019-01-06 | End: 2019-01-06

## 2019-01-06 NOTE — ED PROVIDER NOTES
Patient Seen in: [facility]    History   Patient presents with:  Abdomen/Flank Pain (GI/)  Urinary Symptoms (urologic)    Stated Complaint: ABDOMINAL PAIN/BLOOD IN URINE    HPI    This 22-year-old female presents to the office O2 Device None (Room air)       Current:/61   Pulse 90   Temp 97.8 °F (36.6 °C) (Temporal)   Resp 20   Ht 172.7 cm (5' 8\")   Wt 78.5 kg   LMP 12/13/2018 (Exact Date)   SpO2 99%   BMI 26.30 kg/m²         Physical Exam    General: WH/WN/WD, in NAD, Impression:  Acute cystitis with hematuria  (primary encounter diagnosis)    Disposition:  Discharge  1/6/2019  3:50 pm    Follow-up:  Nestor Bishop DO  [address] [place] [address]    Schedule an appointment as

## 2019-01-06 NOTE — ED INITIAL ASSESSMENT (HPI)
Pt. States she was seen here in the past week or so for abdominal pain. States she did have an ultrasound, showed a kidney stone & ruptured ovarian cyst. States she did have an IUD placed 12/17.  States she has had some improvement with the pain for about 1

## 2019-02-13 ENCOUNTER — LAB ENCOUNTER (OUTPATIENT)
Dept: LAB | Age: 24
End: 2019-02-13
Attending: Other
Payer: COMMERCIAL

## 2019-02-13 DIAGNOSIS — Z00.00 ROUTINE GENERAL MEDICAL EXAMINATION AT A HEALTH CARE FACILITY: Primary | ICD-10-CM

## 2019-02-13 LAB
ALBUMIN SERPL-MCNC: 4.1 G/DL (ref 3.4–5)
ALBUMIN/GLOB SERPL: 1.1 {RATIO} (ref 1–2)
ALP LIVER SERPL-CCNC: 73 U/L (ref 52–144)
ALT SERPL-CCNC: 24 U/L (ref 13–56)
ANION GAP SERPL CALC-SCNC: 5 MMOL/L (ref 0–18)
AST SERPL-CCNC: 15 U/L (ref 15–37)
BASOPHILS # BLD AUTO: 0.01 X10(3) UL (ref 0–0.2)
BASOPHILS NFR BLD AUTO: 0.3 %
BILIRUB SERPL-MCNC: 0.3 MG/DL (ref 0.1–2)
BUN BLD-MCNC: 9 MG/DL (ref 7–18)
BUN/CREAT SERPL: 12.7 (ref 10–20)
CALCIUM BLD-MCNC: 8.7 MG/DL (ref 8.5–10.1)
CHLORIDE SERPL-SCNC: 112 MMOL/L (ref 98–107)
CHOLEST SMN-MCNC: 100 MG/DL (ref ?–200)
CO2 SERPL-SCNC: 24 MMOL/L (ref 21–32)
CREAT BLD-MCNC: 0.71 MG/DL (ref 0.55–1.02)
DEPRECATED RDW RBC AUTO: 46.5 FL (ref 35.1–46.3)
EOSINOPHIL # BLD AUTO: 0.08 X10(3) UL (ref 0–0.7)
EOSINOPHIL NFR BLD AUTO: 2.2 %
ERYTHROCYTE [DISTWIDTH] IN BLOOD BY AUTOMATED COUNT: 13.6 % (ref 11–15)
EST. AVERAGE GLUCOSE BLD GHB EST-MCNC: 85 MG/DL (ref 68–126)
GLOBULIN PLAS-MCNC: 3.7 G/DL (ref 2.8–4.4)
GLUCOSE BLD-MCNC: 88 MG/DL (ref 70–99)
HBA1C MFR BLD HPLC: 4.6 % (ref ?–5.7)
HCT VFR BLD AUTO: 36.9 % (ref 35–48)
HDLC SERPL-MCNC: 35 MG/DL (ref 40–59)
HGB BLD-MCNC: 12.4 G/DL (ref 12–16)
IMM GRANULOCYTES # BLD AUTO: 0 X10(3) UL (ref 0–1)
IMM GRANULOCYTES NFR BLD: 0 %
LDLC SERPL CALC-MCNC: 49 MG/DL (ref ?–100)
LYMPHOCYTES # BLD AUTO: 1.57 X10(3) UL (ref 1–4)
LYMPHOCYTES NFR BLD AUTO: 42.8 %
M PROTEIN MFR SERPL ELPH: 7.8 G/DL (ref 6.4–8.2)
MCH RBC QN AUTO: 31.6 PG (ref 26–34)
MCHC RBC AUTO-ENTMCNC: 33.6 G/DL (ref 31–37)
MCV RBC AUTO: 94.1 FL (ref 80–100)
MONOCYTES # BLD AUTO: 0.3 X10(3) UL (ref 0.1–1)
MONOCYTES NFR BLD AUTO: 8.2 %
NEUTROPHILS # BLD AUTO: 1.71 X10 (3) UL (ref 1.5–7.7)
NEUTROPHILS # BLD AUTO: 1.71 X10(3) UL (ref 1.5–7.7)
NEUTROPHILS NFR BLD AUTO: 46.5 %
NONHDLC SERPL-MCNC: 65 MG/DL (ref ?–130)
OSMOLALITY SERPL CALC.SUM OF ELEC: 290 MOSM/KG (ref 275–295)
PLATELET # BLD AUTO: 216 10(3)UL (ref 150–450)
POTASSIUM SERPL-SCNC: 4 MMOL/L (ref 3.5–5.1)
RBC # BLD AUTO: 3.92 X10(6)UL (ref 3.8–5.3)
SODIUM SERPL-SCNC: 141 MMOL/L (ref 136–145)
TRIGL SERPL-MCNC: 80 MG/DL (ref 30–149)
TSI SER-ACNC: 2.41 MIU/ML (ref 0.36–3.74)
VIT B12 SERPL-MCNC: 388 PG/ML (ref 193–986)
VIT D+METAB SERPL-MCNC: 28.7 NG/ML (ref 30–100)
VLDLC SERPL CALC-MCNC: 16 MG/DL (ref 0–30)
WBC # BLD AUTO: 3.7 X10(3) UL (ref 4–11)

## 2019-02-13 PROCEDURE — 83036 HEMOGLOBIN GLYCOSYLATED A1C: CPT

## 2019-02-13 PROCEDURE — 82306 VITAMIN D 25 HYDROXY: CPT

## 2019-02-13 PROCEDURE — 85025 COMPLETE CBC W/AUTO DIFF WBC: CPT

## 2019-02-13 PROCEDURE — 36415 COLL VENOUS BLD VENIPUNCTURE: CPT

## 2019-02-13 PROCEDURE — 80061 LIPID PANEL: CPT

## 2019-02-13 PROCEDURE — 82607 VITAMIN B-12: CPT

## 2019-02-13 PROCEDURE — 80053 COMPREHEN METABOLIC PANEL: CPT

## 2019-02-13 PROCEDURE — 84443 ASSAY THYROID STIM HORMONE: CPT

## 2019-03-19 ENCOUNTER — APPOINTMENT (OUTPATIENT)
Dept: GENERAL RADIOLOGY | Facility: HOSPITAL | Age: 24
End: 2019-03-19
Attending: EMERGENCY MEDICINE
Payer: COMMERCIAL

## 2019-03-19 ENCOUNTER — APPOINTMENT (OUTPATIENT)
Dept: CT IMAGING | Facility: HOSPITAL | Age: 24
End: 2019-03-19
Attending: EMERGENCY MEDICINE
Payer: COMMERCIAL

## 2019-03-19 ENCOUNTER — HOSPITAL ENCOUNTER (EMERGENCY)
Facility: HOSPITAL | Age: 24
Discharge: HOME | End: 2019-03-19
Attending: EMERGENCY MEDICINE
Payer: COMMERCIAL

## 2019-03-19 VITALS
OXYGEN SATURATION: 100 % | HEART RATE: 80 BPM | RESPIRATION RATE: 18 BRPM | HEIGHT: 68 IN | DIASTOLIC BLOOD PRESSURE: 61 MMHG | TEMPERATURE: 97 F | BODY MASS INDEX: 23.79 KG/M2 | SYSTOLIC BLOOD PRESSURE: 100 MMHG | WEIGHT: 157 LBS

## 2019-03-19 DIAGNOSIS — S16.1XXA STRAIN OF NECK MUSCLE, INITIAL ENCOUNTER: ICD-10-CM

## 2019-03-19 DIAGNOSIS — R10.9 ABDOMINAL PAIN OF UNKNOWN ETIOLOGY: ICD-10-CM

## 2019-03-19 DIAGNOSIS — S80.10XA CONTUSION OF MULTIPLE SITES OF LOWER EXTREMITY, UNSPECIFIED LATERALITY, INITIAL ENCOUNTER: ICD-10-CM

## 2019-03-19 DIAGNOSIS — S39.012A STRAIN OF LUMBAR REGION, INITIAL ENCOUNTER: Primary | ICD-10-CM

## 2019-03-19 LAB
ALBUMIN SERPL-MCNC: 3.8 G/DL (ref 3.4–5)
ALBUMIN/GLOB SERPL: 1 {RATIO} (ref 1–2)
ALP LIVER SERPL-CCNC: 83 U/L (ref 52–144)
ALT SERPL-CCNC: 19 U/L (ref 13–56)
ANION GAP SERPL CALC-SCNC: 5 MMOL/L (ref 0–18)
AST SERPL-CCNC: 8 U/L (ref 15–37)
BASOPHILS # BLD AUTO: 0.01 X10(3) UL (ref 0–0.2)
BASOPHILS NFR BLD AUTO: 0.3 %
BILIRUB SERPL-MCNC: 0.3 MG/DL (ref 0.1–2)
BILIRUB UR QL STRIP.AUTO: NEGATIVE
BUN BLD-MCNC: 13 MG/DL (ref 7–18)
BUN/CREAT SERPL: 19.4 (ref 10–20)
CALCIUM BLD-MCNC: 9 MG/DL (ref 8.5–10.1)
CHLORIDE SERPL-SCNC: 109 MMOL/L (ref 98–107)
CO2 SERPL-SCNC: 25 MMOL/L (ref 21–32)
COLOR UR AUTO: YELLOW
CREAT BLD-MCNC: 0.67 MG/DL (ref 0.55–1.02)
DEPRECATED RDW RBC AUTO: 42.1 FL (ref 35.1–46.3)
EOSINOPHIL # BLD AUTO: 0.07 X10(3) UL (ref 0–0.7)
EOSINOPHIL NFR BLD AUTO: 1.8 %
ERYTHROCYTE [DISTWIDTH] IN BLOOD BY AUTOMATED COUNT: 12.6 % (ref 11–15)
GLOBULIN PLAS-MCNC: 3.7 G/DL (ref 2.8–4.4)
GLUCOSE BLD-MCNC: 89 MG/DL (ref 70–99)
GLUCOSE UR STRIP.AUTO-MCNC: NEGATIVE MG/DL
HCT VFR BLD AUTO: 33.9 % (ref 35–48)
HGB BLD-MCNC: 11.9 G/DL (ref 12–16)
IMM GRANULOCYTES # BLD AUTO: 0.01 X10(3) UL (ref 0–1)
IMM GRANULOCYTES NFR BLD: 0.3 %
KETONES UR STRIP.AUTO-MCNC: NEGATIVE MG/DL
LIPASE SERPL-CCNC: 69 U/L (ref 73–393)
LYMPHOCYTES # BLD AUTO: 1.4 X10(3) UL (ref 1–4)
LYMPHOCYTES NFR BLD AUTO: 36.1 %
M PROTEIN MFR SERPL ELPH: 7.5 G/DL (ref 6.4–8.2)
MCH RBC QN AUTO: 32.3 PG (ref 26–34)
MCHC RBC AUTO-ENTMCNC: 35.1 G/DL (ref 31–37)
MCV RBC AUTO: 92.1 FL (ref 80–100)
MONOCYTES # BLD AUTO: 0.37 X10(3) UL (ref 0.1–1)
MONOCYTES NFR BLD AUTO: 9.5 %
NEUTROPHILS # BLD AUTO: 2.02 X10 (3) UL (ref 1.5–7.7)
NEUTROPHILS # BLD AUTO: 2.02 X10(3) UL (ref 1.5–7.7)
NEUTROPHILS NFR BLD AUTO: 52 %
NITRITE UR QL STRIP.AUTO: NEGATIVE
OSMOLALITY SERPL CALC.SUM OF ELEC: 288 MOSM/KG (ref 275–295)
PH UR STRIP.AUTO: 5 [PH] (ref 4.5–8)
PLATELET # BLD AUTO: 185 10(3)UL (ref 150–450)
POCT LOT NUMBER: NORMAL
POCT URINE PREGNANCY: NEGATIVE
POTASSIUM SERPL-SCNC: 4 MMOL/L (ref 3.5–5.1)
PROT UR STRIP.AUTO-MCNC: NEGATIVE MG/DL
RBC # BLD AUTO: 3.68 X10(6)UL (ref 3.8–5.3)
SODIUM SERPL-SCNC: 139 MMOL/L (ref 136–145)
SP GR UR STRIP.AUTO: 1.02 (ref 1–1.03)
UROBILINOGEN UR STRIP.AUTO-MCNC: 1 MG/DL
WBC # BLD AUTO: 3.9 X10(3) UL (ref 4–11)

## 2019-03-19 PROCEDURE — 99284 EMERGENCY DEPT VISIT MOD MDM: CPT

## 2019-03-19 PROCEDURE — 81001 URINALYSIS AUTO W/SCOPE: CPT | Performed by: EMERGENCY MEDICINE

## 2019-03-19 PROCEDURE — 72100 X-RAY EXAM L-S SPINE 2/3 VWS: CPT | Performed by: EMERGENCY MEDICINE

## 2019-03-19 PROCEDURE — 73590 X-RAY EXAM OF LOWER LEG: CPT | Performed by: EMERGENCY MEDICINE

## 2019-03-19 PROCEDURE — 85025 COMPLETE CBC W/AUTO DIFF WBC: CPT | Performed by: EMERGENCY MEDICINE

## 2019-03-19 PROCEDURE — 80053 COMPREHEN METABOLIC PANEL: CPT | Performed by: EMERGENCY MEDICINE

## 2019-03-19 PROCEDURE — 83690 ASSAY OF LIPASE: CPT | Performed by: EMERGENCY MEDICINE

## 2019-03-19 PROCEDURE — 87086 URINE CULTURE/COLONY COUNT: CPT | Performed by: EMERGENCY MEDICINE

## 2019-03-19 PROCEDURE — 74177 CT ABD & PELVIS W/CONTRAST: CPT | Performed by: EMERGENCY MEDICINE

## 2019-03-19 PROCEDURE — 96374 THER/PROPH/DIAG INJ IV PUSH: CPT

## 2019-03-19 PROCEDURE — 73110 X-RAY EXAM OF WRIST: CPT | Performed by: EMERGENCY MEDICINE

## 2019-03-19 PROCEDURE — 81025 URINE PREGNANCY TEST: CPT

## 2019-03-19 PROCEDURE — 72040 X-RAY EXAM NECK SPINE 2-3 VW: CPT | Performed by: EMERGENCY MEDICINE

## 2019-03-19 PROCEDURE — 99285 EMERGENCY DEPT VISIT HI MDM: CPT

## 2019-03-19 RX ORDER — ONDANSETRON 2 MG/ML
4 INJECTION INTRAMUSCULAR; INTRAVENOUS ONCE
Status: COMPLETED | OUTPATIENT
Start: 2019-03-19 | End: 2019-03-19

## 2019-03-19 RX ORDER — IBUPROFEN 600 MG/1
600 TABLET ORAL ONCE
Status: COMPLETED | OUTPATIENT
Start: 2019-03-19 | End: 2019-03-19
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
## 2019-06-12 ENCOUNTER — OFFICE VISIT (OUTPATIENT)
Dept: FAMILY MEDICINE CLINIC | Facility: CLINIC | Age: 24
End: 2019-06-12
Payer: COMMERCIAL

## 2019-06-12 VITALS
OXYGEN SATURATION: 98 % | SYSTOLIC BLOOD PRESSURE: 98 MMHG | RESPIRATION RATE: 18 BRPM | TEMPERATURE: 98 F | HEART RATE: 82 BPM | HEIGHT: 68 IN | WEIGHT: 176 LBS | DIASTOLIC BLOOD PRESSURE: 66 MMHG | BODY MASS INDEX: 26.67 KG/M2

## 2019-06-12 DIAGNOSIS — H61.23 EXCESSIVE CERUMEN IN BOTH EAR CANALS: ICD-10-CM

## 2019-06-12 DIAGNOSIS — B37.3 VAGINAL CANDIDIASIS: Primary | ICD-10-CM

## 2019-06-12 DIAGNOSIS — F50.01 ANOREXIA NERVOSA, RESTRICTING TYPE: ICD-10-CM

## 2019-06-12 DIAGNOSIS — F41.1 GENERALIZED ANXIETY DISORDER: ICD-10-CM

## 2019-06-12 DIAGNOSIS — F33.1 MODERATE EPISODE OF RECURRENT MAJOR DEPRESSIVE DISORDER (HCC): ICD-10-CM

## 2019-06-12 PROCEDURE — 87660 TRICHOMONAS VAGIN DIR PROBE: CPT | Performed by: FAMILY MEDICINE

## 2019-06-12 PROCEDURE — 87510 GARDNER VAG DNA DIR PROBE: CPT | Performed by: FAMILY MEDICINE

## 2019-06-12 PROCEDURE — 87480 CANDIDA DNA DIR PROBE: CPT | Performed by: FAMILY MEDICINE

## 2019-06-12 PROCEDURE — 99214 OFFICE O/P EST MOD 30 MIN: CPT | Performed by: FAMILY MEDICINE

## 2019-06-12 RX ORDER — FLUCONAZOLE 150 MG/1
150 TABLET ORAL ONCE
Qty: 2 TABLET | Refills: 0 | Status: SHIPPED | OUTPATIENT
Start: 2019-06-12 | End: 2019-06-12

## 2019-06-12 NOTE — PROGRESS NOTES
HPI:    Patient ID: Ezequiel Keller is a 27year old female. HPI   24yr old female with depression/anxiety presents with c/o vaginal discharge, self diagnosed eating disorder and bilateral ear itch/congestion.   States she has been having vaginal itch and 20 MG Oral Tab Take 1 tablet (20 mg total) by mouth daily. (Patient taking differently: Take 40 mg by mouth daily.  ) Disp: 30 tablet Rfl: 0   Ibuprofen (MIDOL) 200 MG Oral Cap Take 600 mg by mouth 2 (two) times daily as needed.  Disp:  Rfl:    metRONIDAZOL and agrees with tx plan  RTC as needed    Orders Placed This Encounter      Vaginitis/Vaginosis, Dna Probe      Meds This Visit:  Requested Prescriptions     Signed Prescriptions Disp Refills   • fluconazole 150 MG Oral Tab 2 tablet 0     Sig: Take 1 table

## 2019-06-14 ENCOUNTER — TELEPHONE (OUTPATIENT)
Dept: FAMILY MEDICINE CLINIC | Facility: CLINIC | Age: 24
End: 2019-06-14

## 2019-06-14 RX ORDER — METRONIDAZOLE 500 MG/1
500 TABLET ORAL 2 TIMES DAILY
Qty: 14 TABLET | Refills: 0 | Status: SHIPPED | OUTPATIENT
Start: 2019-06-14 | End: 2019-06-21

## 2019-06-14 NOTE — TELEPHONE ENCOUNTER
----- Message from [facility], DO sent at 6/13/2019  5:14 PM CDT -----  Pls inform pt that vaginitis swab demonstrates a yeast infection for which she was treated appropriately but also BV (an overgrowth of the natural bacteria).  Would need to tr

## 2019-08-22 PROBLEM — F33.2 MDD (MAJOR DEPRESSIVE DISORDER), RECURRENT EPISODE, SEVERE (HCC): Status: ACTIVE | Noted: 2019-08-22

## 2019-08-23 PROBLEM — F50.9 EATING DISORDER: Status: ACTIVE | Noted: 2019-08-23

## 2019-09-26 ENCOUNTER — OFFICE VISIT (OUTPATIENT)
Dept: FAMILY MEDICINE CLINIC | Facility: CLINIC | Age: 24
End: 2019-09-26
Payer: COMMERCIAL

## 2019-09-26 VITALS
BODY MASS INDEX: 25.31 KG/M2 | DIASTOLIC BLOOD PRESSURE: 70 MMHG | HEART RATE: 95 BPM | HEIGHT: 68 IN | OXYGEN SATURATION: 98 % | RESPIRATION RATE: 16 BRPM | WEIGHT: 167 LBS | SYSTOLIC BLOOD PRESSURE: 118 MMHG | TEMPERATURE: 98 F

## 2019-09-26 DIAGNOSIS — N30.00 ACUTE CYSTITIS WITHOUT HEMATURIA: Primary | ICD-10-CM

## 2019-09-26 DIAGNOSIS — Z30.431 IUD CHECK UP: ICD-10-CM

## 2019-09-26 DIAGNOSIS — Z23 NEED FOR VACCINATION: ICD-10-CM

## 2019-09-26 LAB
APPEARANCE: CLEAR
BILIRUBIN: NEGATIVE
CONTROL LINE PRESENT WITH A CLEAR BACKGROUND (YES/NO): YES YES/NO
GLUCOSE (URINE DIPSTICK): NEGATIVE MG/DL
KETONES (URINE DIPSTICK): NEGATIVE MG/DL
LEUKOCYTES: NEGATIVE
MULTISTIX LOT#: ABNORMAL NUMERIC
NITRITE, URINE: NEGATIVE
PH, URINE: 8.5 (ref 4.5–8)
PREGNANCY TEST, URINE: NEGATIVE
SPECIFIC GRAVITY: 1.02 (ref 1–1.03)
URINE-COLOR: YELLOW
UROBILINOGEN,SEMI-QN: 2 MG/DL (ref 0–1.9)

## 2019-09-26 PROCEDURE — 90686 IIV4 VACC NO PRSV 0.5 ML IM: CPT | Performed by: FAMILY MEDICINE

## 2019-09-26 PROCEDURE — 87086 URINE CULTURE/COLONY COUNT: CPT | Performed by: FAMILY MEDICINE

## 2019-09-26 PROCEDURE — 99214 OFFICE O/P EST MOD 30 MIN: CPT | Performed by: FAMILY MEDICINE

## 2019-09-26 PROCEDURE — 90471 IMMUNIZATION ADMIN: CPT | Performed by: FAMILY MEDICINE

## 2019-09-26 PROCEDURE — 81025 URINE PREGNANCY TEST: CPT | Performed by: FAMILY MEDICINE

## 2019-09-26 PROCEDURE — 81003 URINALYSIS AUTO W/O SCOPE: CPT | Performed by: FAMILY MEDICINE

## 2019-09-26 RX ORDER — CIPROFLOXACIN 250 MG/1
250 TABLET, FILM COATED ORAL 2 TIMES DAILY
Qty: 6 TABLET | Refills: 0 | Status: SHIPPED | OUTPATIENT
Start: 2019-09-26 | End: 2019-09-29

## 2019-09-26 RX ORDER — PHENAZOPYRIDINE HYDROCHLORIDE 100 MG/1
100 TABLET, FILM COATED ORAL 3 TIMES DAILY PRN
Qty: 6 TABLET | Refills: 0 | Status: SHIPPED | OUTPATIENT
Start: 2019-09-26 | End: 2019-09-28

## 2019-09-26 NOTE — PROGRESS NOTES
Von Rodrigues is a 27year old female. HPI:   Patient presents with symptoms of UTI and wanted her IUD checked. C/o urinary frequency, dysuria and urgency that began about 2wks ago.  Denies back pain, fever, n/v.   Has an IUD in place but states she has History:  Social History    Tobacco Use      Smoking status: Never Smoker      Smokeless tobacco: Never Used    Alcohol use:  Yes      Alcohol/week: 0.0 standard drinks      Frequency: Monthly or less      Drinks per session: 1 or 2    Drug use: No        R

## 2019-09-26 NOTE — PATIENT INSTRUCTIONS
Birth Control: IUD (Intrauterine Device)    The IUD (intrauterine device) is small, flexible, and T-shaped. A trained healthcare provider places it in the uterus. The IUD is one of the most effective birth control methods. It is also reversible.  This leena have:  · A sexually transmitted infection (STI) or possible STI  · Liver problems  · Blood clots (for progestin IUD only)  · Breast cancer or a history of breast cancer (progestin IUD only)   Date Last Reviewed: 3/1/2017  © 9053-2010 [employer], easier for you to urinate and help relieve pain. Your healthcare provider can tell you more about your treatment options. Untreated, symptoms may get worse.   When to call your healthcare provider  Call the healthcare provider right away if you have any of

## 2019-09-29 ENCOUNTER — PATIENT MESSAGE (OUTPATIENT)
Dept: FAMILY MEDICINE CLINIC | Facility: CLINIC | Age: 24
End: 2019-09-29

## 2019-09-30 NOTE — TELEPHONE ENCOUNTER
From: Tyrell Jimenez  To: Kathy Schaefer DO  Sent: 9/29/2019 4:24 PM CDT  Subject: Test Results Question    I have a question about URINE CULTURE, ROUTINE resulted on 9/27/19, 9:03 PM.    I do not understand this message you have sent.    Anh Velez

## 2019-09-30 NOTE — TELEPHONE ENCOUNTER
Notes recorded by Maikol Gamez DO on 9/30/2019 at 12:17 AM CDT  Pls inform pt that UCx neg for infection.  May finish course of abx if symptoms have improved      Clarified message on Pt's my chart

## 2019-10-01 ENCOUNTER — PATIENT MESSAGE (OUTPATIENT)
Dept: FAMILY MEDICINE CLINIC | Facility: CLINIC | Age: 24
End: 2019-10-01

## 2019-10-01 ENCOUNTER — OFFICE VISIT (OUTPATIENT)
Dept: FAMILY MEDICINE CLINIC | Facility: CLINIC | Age: 24
End: 2019-10-01
Payer: COMMERCIAL

## 2019-10-01 VITALS
BODY MASS INDEX: 24.69 KG/M2 | HEART RATE: 88 BPM | HEIGHT: 67.75 IN | SYSTOLIC BLOOD PRESSURE: 94 MMHG | TEMPERATURE: 98 F | RESPIRATION RATE: 16 BRPM | WEIGHT: 161 LBS | DIASTOLIC BLOOD PRESSURE: 68 MMHG

## 2019-10-01 DIAGNOSIS — R30.0 DYSURIA: ICD-10-CM

## 2019-10-01 DIAGNOSIS — Z86.59 HISTORY OF SUICIDAL IDEATION: ICD-10-CM

## 2019-10-01 DIAGNOSIS — Z87.42 HISTORY OF OVARIAN CYST: ICD-10-CM

## 2019-10-01 DIAGNOSIS — R10.2 PELVIC PAIN: Primary | ICD-10-CM

## 2019-10-01 DIAGNOSIS — F41.8 DEPRESSION WITH ANXIETY: ICD-10-CM

## 2019-10-01 DIAGNOSIS — K59.09 CHRONIC CONSTIPATION: ICD-10-CM

## 2019-10-01 PROCEDURE — 87480 CANDIDA DNA DIR PROBE: CPT | Performed by: FAMILY MEDICINE

## 2019-10-01 PROCEDURE — 99214 OFFICE O/P EST MOD 30 MIN: CPT | Performed by: FAMILY MEDICINE

## 2019-10-01 PROCEDURE — 87660 TRICHOMONAS VAGIN DIR PROBE: CPT | Performed by: FAMILY MEDICINE

## 2019-10-01 PROCEDURE — 87510 GARDNER VAG DNA DIR PROBE: CPT | Performed by: FAMILY MEDICINE

## 2019-10-01 NOTE — TELEPHONE ENCOUNTER
From: Ugo Tomlinson  To: Junior Mcfarland DO  Sent: 10/1/2019 10:48 AM CDT  Subject: Visit Follow-up Question    I will make another appointment for today, the pain has gotten worse this morning.

## 2019-10-01 NOTE — TELEPHONE ENCOUNTER
Future Appointments   Date Time Provider Yousif Ryan   10/1/2019 12:00 PM Ada Celaya, DO [facility]

## 2019-10-01 NOTE — PATIENT INSTRUCTIONS
Treating Constipation    Constipation is a common and often uncomfortable problem. Constipation means you have bowel movements fewer than 3 times per week, or strain to pass hard, dry stool. It can last a short time.  Or it can be a problem that never see © 5601-0002 The [address] [facility], [address] [place]. All rights reserved. This information is not intended as a substitute for professional medical care. Always follow your healthcare professional's instructions.         Depress · Keep it simple. Depression saps your energy and concentration. So you won’t be able to do all the things you used to do. Set small goals and do what you can. · Be with others. Don’t isolate yourself—you’ll only feel worse. Try to be with other people.  A

## 2019-10-01 NOTE — PROGRESS NOTES
HPI:    Patient ID: Miguel Jarvis is a 27year old female. HPI   24yr old female with depression/anxiety presents for c/o persistent dysuria, urinary frequency and pelvic pain. States her symptoms have been ongoing for more than 2wks now.  Seen last we dysuria, frequency and pelvic pain. Negative for flank pain and menstrual problem. Psychiatric/Behavioral: Positive for decreased concentration and dysphoric mood. Negative for sleep disturbance and suicidal ideas.               Current Outpatient Medicat respiratory distress. She has no wheezes. She has no rales. Abdominal: Soft. Bowel sounds are normal. She exhibits no distension. There is tenderness (at LLQ/pelvic region). There is no rebound and no guarding.    Genitourinary: There is no rash on the ri (SJE=[id number])       #[id number]

## 2019-10-02 ENCOUNTER — HOSPITAL ENCOUNTER (OUTPATIENT)
Dept: ULTRASOUND IMAGING | Age: 24
Discharge: HOME | End: 2019-10-02
Attending: FAMILY MEDICINE
Payer: COMMERCIAL

## 2019-10-02 DIAGNOSIS — R10.2 PELVIC PAIN: ICD-10-CM

## 2019-10-02 DIAGNOSIS — Z87.42 HISTORY OF OVARIAN CYST: ICD-10-CM

## 2019-10-02 PROCEDURE — 76856 US EXAM PELVIC COMPLETE: CPT | Performed by: FAMILY MEDICINE

## 2019-10-02 PROCEDURE — 76830 TRANSVAGINAL US NON-OB: CPT | Performed by: FAMILY MEDICINE

## 2019-11-05 ENCOUNTER — TELEPHONE (OUTPATIENT)
Dept: FAMILY MEDICINE CLINIC | Facility: CLINIC | Age: 24
End: 2019-11-05

## 2019-11-05 NOTE — TELEPHONE ENCOUNTER
Received call from patient's mother. States she was in recently with her daughter to see Dr. Dean Lambert.   She has a question she would like to discuss with Dr. Dean Lambert regarding patient's symptoms and a possible test.  Advised doctor is not in the office yet a

## 2019-12-06 ENCOUNTER — OFFICE VISIT (OUTPATIENT)
Dept: FAMILY MEDICINE CLINIC | Facility: CLINIC | Age: 24
End: 2019-12-06
Payer: COMMERCIAL

## 2019-12-06 VITALS
SYSTOLIC BLOOD PRESSURE: 98 MMHG | TEMPERATURE: 99 F | OXYGEN SATURATION: 99 % | DIASTOLIC BLOOD PRESSURE: 56 MMHG | BODY MASS INDEX: 26.58 KG/M2 | RESPIRATION RATE: 16 BRPM | HEART RATE: 76 BPM | WEIGHT: 173.38 LBS | HEIGHT: 67.75 IN

## 2019-12-06 DIAGNOSIS — L73.9 FOLLICULITIS: Primary | ICD-10-CM

## 2019-12-06 DIAGNOSIS — R05.9 COUGH: ICD-10-CM

## 2019-12-06 PROCEDURE — 99214 OFFICE O/P EST MOD 30 MIN: CPT | Performed by: FAMILY MEDICINE

## 2019-12-06 RX ORDER — LAMOTRIGINE 100 MG/1
100 TABLET ORAL
Refills: 1 | COMMUNITY
Start: 2019-11-11

## 2019-12-06 RX ORDER — CEFADROXIL 500 MG/1
500 CAPSULE ORAL 2 TIMES DAILY
Qty: 14 CAPSULE | Refills: 0 | Status: SHIPPED | OUTPATIENT
Start: 2019-12-06 | End: 2019-12-13

## 2019-12-06 NOTE — PATIENT INSTRUCTIONS
Understanding Folliculitis  Folliculitis is when hair follicles become inflamed. Follicles are the tiny holes from which hair grows out of your skin. This skin condition can occur any place on the body where hair grows.  But it’s often found on the neck, medical care. Always follow your healthcare professional's instructions.

## 2019-12-06 NOTE — PROGRESS NOTES
HPI:   Ezequiel Keller is a 27year old female who presents for c/o cyst in her vaginal area and cough. C/o painful cyst in vaginal area that she thinks started a few weeks ago. Thinks it was after she shaved.  Started off small and thought it was a pimple, gonorrhea 12/17/2018   • Hx of chlamydia infection 12/17/2018   • PTSD (post-traumatic stress disorder)    • Vandana Inserted 12/17/2018      Past Surgical History:   Procedure Laterality Date   • ADENOIDECTOMY  1999      Family History   Problem Relation Ag po bid x 7d, reviewed indications, dosing and SEs  - monitor   - Cefadroxil 500 MG Oral Cap; Take 1 capsule (500 mg total) by mouth 2 (two) times daily for 7 days. Dispense: 14 capsule; Refill: 0    2.  Cough  - advised symptomatic tx  - monitor    The pat

## 2019-12-07 ENCOUNTER — PATIENT MESSAGE (OUTPATIENT)
Dept: FAMILY MEDICINE CLINIC | Facility: CLINIC | Age: 24
End: 2019-12-07

## 2019-12-10 NOTE — TELEPHONE ENCOUNTER
From: Irine Rinne  To: Debbie Zazueta DO  Sent: 12/7/2019 2:03 AM CST  Subject: Visit Follow-up Question    Hi there, I was wondering, if the bump on my skin under my underwear gets bigger or doesnt go away after taking the medicine, at what point shoul

## 2020-02-04 ENCOUNTER — APPOINTMENT (OUTPATIENT)
Dept: DERMATOLOGY | Age: 25
End: 2020-02-04

## 2020-02-11 ENCOUNTER — TELEPHONE (OUTPATIENT)
Dept: DERMATOLOGY | Age: 25
End: 2020-02-11

## 2020-02-11 ENCOUNTER — OFFICE VISIT (OUTPATIENT)
Dept: DERMATOLOGY | Age: 25
End: 2020-02-11

## 2020-02-11 DIAGNOSIS — H00.012 HORDEOLUM EXTERNUM OF RIGHT LOWER EYELID: ICD-10-CM

## 2020-02-11 DIAGNOSIS — L72.3 SEBACEOUS CYST: Primary | ICD-10-CM

## 2020-02-11 PROCEDURE — 99202 OFFICE O/P NEW SF 15 MIN: CPT | Performed by: DERMATOLOGY

## 2020-02-11 PROCEDURE — 11900 INJECT SKIN LESIONS </W 7: CPT | Performed by: DERMATOLOGY

## 2020-02-11 RX ORDER — TRAZODONE HYDROCHLORIDE 50 MG/1
75 TABLET ORAL
COMMUNITY
Start: 2018-08-30

## 2020-02-11 RX ORDER — NORETHINDRONE ACETATE AND ETHINYL ESTRADIOL 1MG-20(21)
1 KIT ORAL
COMMUNITY
Start: 2020-01-14 | End: 2021-01-13

## 2020-02-11 RX ORDER — BUPROPION HYDROCHLORIDE 150 MG/1
150 TABLET ORAL
COMMUNITY
Start: 2019-08-30

## 2020-02-11 RX ORDER — LAMOTRIGINE 100 MG/1
100 TABLET ORAL
COMMUNITY
Start: 2019-11-11

## 2020-02-11 RX ORDER — OMEGA-3 FATTY ACIDS/FISH OIL 300-1000MG
600 CAPSULE ORAL
COMMUNITY

## 2020-02-11 RX ORDER — ESCITALOPRAM OXALATE 20 MG/1
20 TABLET ORAL
COMMUNITY
Start: 2018-07-16

## 2020-02-11 RX ORDER — ALBUTEROL SULFATE 90 UG/1
AEROSOL, METERED RESPIRATORY (INHALATION)
COMMUNITY

## 2020-02-11 RX ORDER — BUPROPION HYDROCHLORIDE 300 MG/1
300 TABLET ORAL
COMMUNITY
Start: 2019-08-30

## 2020-02-11 RX ORDER — CEPHALEXIN 500 MG/1
CAPSULE ORAL
Qty: 21 CAPSULE | Refills: 0 | Status: SHIPPED | OUTPATIENT
Start: 2020-02-11

## 2020-02-11 RX ORDER — FLUCONAZOLE 150 MG/1
150 TABLET ORAL ONCE
Qty: 1 TABLET | Refills: 1 | Status: SHIPPED | OUTPATIENT
Start: 2020-02-11 | End: 2020-02-11

## 2020-02-12 PROCEDURE — 11900 INJECT SKIN LESIONS </W 7: CPT | Performed by: DERMATOLOGY

## 2020-02-12 PROCEDURE — 99202 OFFICE O/P NEW SF 15 MIN: CPT | Performed by: DERMATOLOGY

## 2020-02-25 ENCOUNTER — OFFICE VISIT (OUTPATIENT)
Dept: FAMILY MEDICINE CLINIC | Facility: CLINIC | Age: 25
End: 2020-02-25
Payer: COMMERCIAL

## 2020-02-25 ENCOUNTER — OFFICE VISIT (OUTPATIENT)
Dept: OCCUPATIONAL MEDICINE | Age: 25
End: 2020-02-25
Attending: PHYSICIAN ASSISTANT

## 2020-02-25 ENCOUNTER — LAB ENCOUNTER (OUTPATIENT)
Dept: LAB | Age: 25
End: 2020-02-25
Attending: FAMILY MEDICINE
Payer: COMMERCIAL

## 2020-02-25 VITALS
TEMPERATURE: 98 F | RESPIRATION RATE: 16 BRPM | HEIGHT: 67.72 IN | DIASTOLIC BLOOD PRESSURE: 64 MMHG | BODY MASS INDEX: 26.98 KG/M2 | HEART RATE: 68 BPM | SYSTOLIC BLOOD PRESSURE: 102 MMHG | WEIGHT: 176 LBS | OXYGEN SATURATION: 100 %

## 2020-02-25 DIAGNOSIS — F33.41 RECURRENT MAJOR DEPRESSIVE DISORDER, IN PARTIAL REMISSION (HCC): ICD-10-CM

## 2020-02-25 DIAGNOSIS — F41.1 GENERALIZED ANXIETY DISORDER: ICD-10-CM

## 2020-02-25 DIAGNOSIS — Z02.0 SCHOOL PHYSICAL EXAM: Primary | ICD-10-CM

## 2020-02-25 DIAGNOSIS — F43.10 POST TRAUMATIC STRESS DISORDER (PTSD): ICD-10-CM

## 2020-02-25 DIAGNOSIS — Z01.84 IMMUNITY STATUS TESTING: ICD-10-CM

## 2020-02-25 LAB
HBV SURFACE AB SER QL: NONREACTIVE
HBV SURFACE AB SERPL IA-ACNC: <3.1 MIU/ML
RUBV IGG SER QL: POSITIVE
RUBV IGG SER-ACNC: 11.5 IU/ML (ref 10–?)

## 2020-02-25 PROCEDURE — 86735 MUMPS ANTIBODY: CPT

## 2020-02-25 PROCEDURE — 36415 COLL VENOUS BLD VENIPUNCTURE: CPT

## 2020-02-25 PROCEDURE — 99395 PREV VISIT EST AGE 18-39: CPT | Performed by: FAMILY MEDICINE

## 2020-02-25 PROCEDURE — 86706 HEP B SURFACE ANTIBODY: CPT

## 2020-02-25 PROCEDURE — 86762 RUBELLA ANTIBODY: CPT

## 2020-02-25 PROCEDURE — 86787 VARICELLA-ZOSTER ANTIBODY: CPT

## 2020-02-25 PROCEDURE — 86765 RUBEOLA ANTIBODY: CPT

## 2020-02-25 NOTE — PROGRESS NOTES
HPI:    Patient ID: Ron Nails is a 27year old female. HPI   24yr old female with MDD, SAMI and PTSD presents for school physical. 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 Needs form completion and titers done.    MDD, SAMI and PTSD, follow Allergies:  Pollen                  OTHER (SEE COMMENTS)    Comment:Stuffy nose  Grass                   RASH   PHYSICAL EXAM:   Physical Exam   Constitutional: She is oriented to person, place, and time. She appears well-developed and well-nourished. this encounter       Imaging & Referrals:  None       QD#[id number]

## 2020-02-25 NOTE — PATIENT INSTRUCTIONS
Prevention Guidelines, Women Ages 25 to 44  Screening tests and vaccines are an important part of managing your health. A screening test is done to find possible disorders or diseases in people who don't have any symptoms.  The goal is to find a disease e diabetes Lifelong testing every 3 years   Type 2 diabetes All women with prediabetes Every year   Gonorrhea Sexually active women at increased risk for infection At routine exams   Hepatitis C Anyone at increased risk At routine exams   HIV All women kelly Meningococcal Women at increased risk for infection should talk with their healthcare provider 1 or more doses   Pneumococcal conjugate vaccine (PCV13) and pneumococcal polysaccharide vaccine (PPSV23) Women at increased risk for infection should talk wit

## 2020-02-26 LAB
MEV IGG SER-ACNC: 213 AU/ML (ref 16.5–?)
MUV IGG SER IA-ACNC: 21.9 AU/ML (ref 11–?)
VZV IGG SER IA-ACNC: 169.2 (ref 165–?)

## 2020-02-27 ENCOUNTER — TELEPHONE (OUTPATIENT)
Dept: FAMILY MEDICINE CLINIC | Facility: CLINIC | Age: 25
End: 2020-02-27

## 2020-02-27 DIAGNOSIS — Z23 NEED FOR VACCINATION: Primary | ICD-10-CM

## 2020-02-27 NOTE — TELEPHONE ENCOUNTER
Notes recorded by Lis Cardenas DO on 2/26/2020 at 9:46 PM CST  Pls inform pt that titers show immunity to varicella and MMR. However, she does not have immunity against Hep B. Will need to restart Hep B series. Pls schedule RN visit.       Hep B v

## 2020-02-28 NOTE — TELEPHONE ENCOUNTER
Viewed by Janneth Dia on 2/27/2020  1:59 PM   Written by Anthony Ramachandran LPN on 9/54/8463  0:94 PM   Lb Ruiz, DO        titers show immunity to varicella and MMR. However, you do not have immunity against Hep B.  Will need to restart Hep

## 2020-03-03 ENCOUNTER — OFFICE VISIT (OUTPATIENT)
Dept: OCCUPATIONAL MEDICINE | Age: 25
End: 2020-03-03
Attending: PHYSICIAN ASSISTANT

## 2020-03-05 ENCOUNTER — OCC HEALTH (OUTPATIENT)
Dept: OCCUPATIONAL MEDICINE | Age: 25
End: 2020-03-05
Attending: FAMILY MEDICINE

## 2020-03-23 ENCOUNTER — TELEPHONE (OUTPATIENT)
Dept: FAMILY MEDICINE CLINIC | Facility: CLINIC | Age: 25
End: 2020-03-23

## 2020-03-23 PROCEDURE — 99213 OFFICE O/P EST LOW 20 MIN: CPT | Performed by: FAMILY MEDICINE

## 2020-03-23 NOTE — TELEPHONE ENCOUNTER
Symptoms are going on for about a week: Body aches in the upper body ,  Sleeping a lot more then normal    Throat hurts on and off.      No fever but is having night sweats    Chest pain on both sides of chest - Hurts when she coughs \"really bad\"     C

## 2020-03-23 NOTE — TELEPHONE ENCOUNTER
Virtual/Telephone Check-In    Yuridia Mercer verbally consents to a Virtual/Telephone Check-In service on 03/23/20. Patient understands and accepts financial responsibility for any deductible, co-insurance and/or co-pays associated with this service.     D

## 2020-03-23 NOTE — TELEPHONE ENCOUNTER
Patient called and states she is having weakness, body aches, pain when breathing in chest and back, night sweats, cough, mild sore throat, sleeping a lot. 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 PLease advise.

## 2020-03-24 ENCOUNTER — PATIENT MESSAGE (OUTPATIENT)
Dept: FAMILY MEDICINE CLINIC | Facility: CLINIC | Age: 25
End: 2020-03-24

## 2020-03-24 NOTE — TELEPHONE ENCOUNTER
From: Ayesha Samaniego  To: Nohelia Kaplan DO  Sent: 3/24/2020 1:22 PM CDT  Subject: Visit Follow-up Question    Hello,  I was wondering since we had a virtual appointment yesterday, and you mentioned staying home for at least another 7-14 days, if I could g

## 2020-07-13 ENCOUNTER — VIRTUAL PHONE E/M (OUTPATIENT)
Dept: FAMILY MEDICINE CLINIC | Facility: CLINIC | Age: 25
End: 2020-07-13
Payer: COMMERCIAL

## 2020-07-13 DIAGNOSIS — R51.9 HEADACHE, UNSPECIFIED HEADACHE TYPE: Primary | ICD-10-CM

## 2020-07-13 DIAGNOSIS — R11.0 NAUSEA: ICD-10-CM

## 2020-07-13 DIAGNOSIS — Z78.9 HISTORY OF RECENT TRAVEL: ICD-10-CM

## 2020-07-13 DIAGNOSIS — J02.9 SORE THROAT: ICD-10-CM

## 2020-07-13 DIAGNOSIS — R19.7 DIARRHEA, UNSPECIFIED TYPE: ICD-10-CM

## 2020-07-13 PROCEDURE — 99214 OFFICE O/P EST MOD 30 MIN: CPT | Performed by: FAMILY MEDICINE

## 2020-07-13 RX ORDER — ONDANSETRON 4 MG/1
4 TABLET, FILM COATED ORAL EVERY 8 HOURS PRN
Qty: 20 TABLET | Refills: 0 | Status: SHIPPED | OUTPATIENT
Start: 2020-07-13 | End: 2021-03-13

## 2020-07-13 NOTE — PATIENT INSTRUCTIONS
Coronavirus Disease 2019 (COVID-19): Caring for Yourself or Others  If you or a household member have symptoms of COVID-19, follow the guidelines below for preventing spread of the virus, and managing symptoms.   If you think you have COVID-19 symptoms  · instructions the healthcare staff give you. If you have been diagnosed with COVID-19  · Stay home and start self-isolation. Don’t leave your home unless you need to get medical care. Don't go to work, school, or public areas.  Don't use public transporta fights the virus. This is known as supportive care. Take care of yourself at home by:  · Getting rest. This helps your body fight the illness. · Staying hydrated. Drinking liquids is the best way to prevent dehydration. . Try to drink 6 to 8 glasses of liq bathroom surfaces, and others. · Don’t let anyone share household items with the sick person. This includes eating and drinking tools, towels, sheets, or blankets. · Clean fabrics and laundry thoroughly.   · Keep other people and pets away from the sick p isolation and when it's OK to stop.   When to call your healthcare provider  Call your healthcare provider right away if a sick person has any of these:  · Trouble breathing  · Pain or pressure in chest  If a sick person has any of these, call 911:  · Troub self-isolate at home for at least 14 days and follow the recommendations below. 10 Ways to Manage Your Health at Home      1. Stay home from work, school, and away from other public places.  If you must go out, avoid using any kind of public transportat should contact your health care provider before seeking further care. Process measures to keep everyone safe in this difficult time are changing frequently. Your healthcare provider can help direct you on next steps.     If you have not been exposed or are [employer]nl. pdf  [email or web address]cy  http://[email or web address].[place].gov/topics-services/diseases-and-conditions/dise

## 2020-07-13 NOTE — PROGRESS NOTES
Virtual Telephone Check-In    [facility] verbally consents to a Virtual/Telephone Check-In visit on 07/13/20. Patient has been referred to the [facility] website at [email or web address] to review the yearly Consent to Treat document.     Patient Golden Jones 50 mg by mouth nightly.  ) 45 tablet 0   • escitalopram 20 MG Oral Tab Take 1 tablet (20 mg total) by mouth daily. 30 tablet 0   • Ibuprofen (MIDOL) 200 MG Oral Cap Take 600 mg by mouth 2 (two) times daily as needed.         Past Medical History:   Eloise Abraham tx as outlined on CDC patient guidelines   - pt instructed to go to ER or call 911 for any worsening symptoms or acute distress  - patient indicates understanding of these issues and agrees to the plan. - SARS-COV-2 BY PCR ();  Future  - Ondansetron H

## 2020-07-14 ENCOUNTER — LAB ENCOUNTER (OUTPATIENT)
Dept: LAB | Facility: HOSPITAL | Age: 25
End: 2020-07-14
Attending: FAMILY MEDICINE
Payer: COMMERCIAL

## 2020-07-14 DIAGNOSIS — J02.9 SORE THROAT: ICD-10-CM

## 2020-07-14 DIAGNOSIS — R11.0 NAUSEA: ICD-10-CM

## 2020-07-14 DIAGNOSIS — Z78.9 HISTORY OF RECENT TRAVEL: ICD-10-CM

## 2020-07-14 DIAGNOSIS — R51.9 HEADACHE, UNSPECIFIED HEADACHE TYPE: ICD-10-CM

## 2020-07-14 DIAGNOSIS — R19.7 DIARRHEA, UNSPECIFIED TYPE: ICD-10-CM

## 2020-07-15 LAB — SARS-COV-2 RNA RESP QL NAA+PROBE: NOT DETECTED

## 2020-08-04 ENCOUNTER — TELEPHONE (OUTPATIENT)
Dept: FAMILY MEDICINE CLINIC | Facility: CLINIC | Age: 25
End: 2020-08-04

## 2020-08-04 NOTE — TELEPHONE ENCOUNTER
Called pt stating no new or worsening sx. On going migraines since November, 2019. Hx of PTSD- no SI/HI, follows with psychiatrist. Experiencing insomnia x1 month. Confirmed with pt all previous COVID sx have subsided. No congestion. No sore throat.  No co

## 2020-08-04 NOTE — TELEPHONE ENCOUNTER
Pt c/o chronic headache and Insomnia x's 1 month scheduled for Sat at 9:00am, does this need triage or ok for appt?

## 2020-08-04 NOTE — TELEPHONE ENCOUNTER
Called pt to inform per PCP agrees, no concerning or acute s/s. Will evaluate in office as scheduled on 8/8. Pt aware if any worsening sx or new sx- N/V, dizziness, vision changes, etc will go to ER. No further questions or concerns.  Pt verbalized Birgit Lucas

## 2020-08-08 ENCOUNTER — OFFICE VISIT (OUTPATIENT)
Dept: FAMILY MEDICINE CLINIC | Facility: CLINIC | Age: 25
End: 2020-08-08
Payer: COMMERCIAL

## 2020-08-08 VITALS
SYSTOLIC BLOOD PRESSURE: 118 MMHG | OXYGEN SATURATION: 96 % | DIASTOLIC BLOOD PRESSURE: 78 MMHG | BODY MASS INDEX: 27.75 KG/M2 | HEART RATE: 91 BPM | TEMPERATURE: 98 F | HEIGHT: 67.72 IN | RESPIRATION RATE: 18 BRPM | WEIGHT: 181 LBS

## 2020-08-08 DIAGNOSIS — N30.00 ACUTE CYSTITIS WITHOUT HEMATURIA: ICD-10-CM

## 2020-08-08 DIAGNOSIS — G43.709 CHRONIC MIGRAINE WITHOUT AURA WITHOUT STATUS MIGRAINOSUS, NOT INTRACTABLE: Primary | ICD-10-CM

## 2020-08-08 DIAGNOSIS — R35.0 URINARY FREQUENCY: ICD-10-CM

## 2020-08-08 LAB
MULTISTIX LOT#: NORMAL NUMERIC
PH, URINE: 5 (ref 4.5–8)
PROTEIN (URINE DIPSTICK): 30 MG/DL
SPECIFIC GRAVITY: 1.03 (ref 1–1.03)
URINE-COLOR: YELLOW
UROBILINOGEN,SEMI-QN: 1 MG/DL (ref 0–1.9)

## 2020-08-08 PROCEDURE — 3074F SYST BP LT 130 MM HG: CPT | Performed by: FAMILY MEDICINE

## 2020-08-08 PROCEDURE — 3008F BODY MASS INDEX DOCD: CPT | Performed by: FAMILY MEDICINE

## 2020-08-08 PROCEDURE — 87086 URINE CULTURE/COLONY COUNT: CPT | Performed by: FAMILY MEDICINE

## 2020-08-08 PROCEDURE — 81003 URINALYSIS AUTO W/O SCOPE: CPT | Performed by: FAMILY MEDICINE

## 2020-08-08 PROCEDURE — 3078F DIAST BP <80 MM HG: CPT | Performed by: FAMILY MEDICINE

## 2020-08-08 PROCEDURE — 99214 OFFICE O/P EST MOD 30 MIN: CPT | Performed by: FAMILY MEDICINE

## 2020-08-08 RX ORDER — LAMOTRIGINE 25 MG/1
50 TABLET ORAL NIGHTLY
COMMUNITY
Start: 2020-07-24 | End: 2020-08-08

## 2020-08-08 RX ORDER — ESCITALOPRAM OXALATE 20 MG/1
10 TABLET ORAL DAILY
Qty: 30 TABLET | Refills: 0 | COMMUNITY
Start: 2020-08-08 | End: 2021-01-18 | Stop reason: ALTCHOICE

## 2020-08-08 RX ORDER — BUTALBITAL, ACETAMINOPHEN AND CAFFEINE 50; 325; 40 MG/1; MG/1; MG/1
1 TABLET ORAL EVERY 6 HOURS PRN
Qty: 30 TABLET | Refills: 0 | Status: SHIPPED | OUTPATIENT
Start: 2020-08-08 | End: 2020-09-08

## 2020-08-08 RX ORDER — LAMOTRIGINE 25 MG/1
50 TABLET ORAL DAILY
Qty: 30 TABLET | Refills: 2 | COMMUNITY
Start: 2020-08-08 | End: 2021-07-06 | Stop reason: DRUGHIGH

## 2020-08-08 RX ORDER — CIPROFLOXACIN 250 MG/1
250 TABLET, FILM COATED ORAL 2 TIMES DAILY
Qty: 6 TABLET | Refills: 0 | Status: SHIPPED | OUTPATIENT
Start: 2020-08-08 | End: 2020-08-11

## 2020-08-08 NOTE — PATIENT INSTRUCTIONS
Preventing Migraine Headaches: Triggers  The first step in preventing migraines is to learn what triggers them. You may then be able to control your triggers to avoid or reduce the severity of your migraines.    Know your triggers  Be aware that you may h sure to carry a hat, sunglasses, and your medicines. Be alert for migraine symptoms, so you can treat a migraine early if it happens. Ernst last reviewed this educational content on 5/1/2018  © 3131-3514 [employer].  [facility]

## 2020-08-08 NOTE — PROGRESS NOTES
HPI:    Patient ID: Eladia Heard is a 23year old female. HPI   25yr old female with bipolar disorder, type 2 presents for c/o migraine headaches and insomnia.      Migraines since Nov. 2020 and have increased in frequency and severity over the past fe by mouth every 6 (six) hours as needed for Pain or Headaches. 30 tablet 0   • Ciprofloxacin HCl 250 MG Oral Tab Take 1 tablet (250 mg total) by mouth 2 (two) times daily for 3 days.  6 tablet 0   • escitalopram 20 MG Oral Tab Take 0.75 tablets (15 mg total) normal and breath sounds normal. No respiratory distress. She has no wheezes. She has no rales. Neurological: She is alert and oriented to person, place, and time. No cranial nerve deficit. Coordination normal.   Skin: Skin is warm and dry.    Psychiatric Prescriptions     Signed Prescriptions Disp Refills   • Butalbital-APAP-Caffeine -40 MG Oral Tab 30 tablet 0     Sig: Take 1 tablet by mouth every 6 (six) hours as needed for Pain or Headaches.    • Ciprofloxacin HCl 250 MG Oral Tab 6 tablet 0     Sig

## 2020-08-09 ENCOUNTER — PATIENT MESSAGE (OUTPATIENT)
Dept: FAMILY MEDICINE CLINIC | Facility: CLINIC | Age: 25
End: 2020-08-09

## 2020-08-10 NOTE — TELEPHONE ENCOUNTER
Viewed by Adonay Nurse on 8/9/2020 11:22 PM   Written by Chandrika Maurer, DO on 8/9/2020 11:09 PM   Sherif Stone,  Your urine culture is negative. No infection is noted. You may discontinue the antibiotics.    Take care,   Dr. Billie Mares

## 2020-08-10 NOTE — TELEPHONE ENCOUNTER
From: Sonia Lee  To: Hoda Still DO  Sent: 8/9/2020 10:44 AM CDT  Subject: Test Results Question    I have a question about Urinalysis w/o scope resulted on 8/8/20, 9:38 AM.    Hello,   I was wondering if I do in fact have a UTI?  And how do you kno

## 2020-08-11 NOTE — TELEPHONE ENCOUNTER
May need further testing (vaginitis swab/STD screening).  Pls schedule appt and may then refer to urology, if needed

## 2020-08-14 ENCOUNTER — TELEPHONE (OUTPATIENT)
Dept: FAMILY MEDICINE CLINIC | Facility: CLINIC | Age: 25
End: 2020-08-14

## 2020-08-14 NOTE — TELEPHONE ENCOUNTER
Patient called to schedule an appointment for 8/17 10:20. When answering the screening questions, she responded yes to having contact with someone confirmed to have covid.     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

## 2020-08-17 NOTE — TELEPHONE ENCOUNTER
Noted   Future Appointments   Date Time Provider Yousif Ryan   8/26/2020  1:00 PM Sandra Celaya, DO [facility]

## 2020-08-19 ENCOUNTER — HOSPITAL ENCOUNTER (OUTPATIENT)
Dept: MRI IMAGING | Age: 25
Discharge: HOME | End: 2020-08-19
Attending: FAMILY MEDICINE
Payer: COMMERCIAL

## 2020-08-19 DIAGNOSIS — G43.709 CHRONIC MIGRAINE WITHOUT AURA WITHOUT STATUS MIGRAINOSUS, NOT INTRACTABLE: ICD-10-CM

## 2020-08-19 PROCEDURE — 70553 MRI BRAIN STEM W/O & W/DYE: CPT | Performed by: FAMILY MEDICINE

## 2020-08-19 PROCEDURE — A9575 INJ GADOTERATE MEGLUMI 0.1ML: HCPCS | Performed by: FAMILY MEDICINE

## 2020-08-26 ENCOUNTER — OFFICE VISIT (OUTPATIENT)
Dept: FAMILY MEDICINE CLINIC | Facility: CLINIC | Age: 25
End: 2020-08-26
Payer: COMMERCIAL

## 2020-08-26 VITALS
BODY MASS INDEX: 28.06 KG/M2 | TEMPERATURE: 98 F | WEIGHT: 183 LBS | SYSTOLIC BLOOD PRESSURE: 112 MMHG | HEART RATE: 85 BPM | HEIGHT: 67.72 IN | DIASTOLIC BLOOD PRESSURE: 70 MMHG | RESPIRATION RATE: 16 BRPM | OXYGEN SATURATION: 98 %

## 2020-08-26 DIAGNOSIS — E34.8 PINEAL GLAND CYST: ICD-10-CM

## 2020-08-26 DIAGNOSIS — R10.2 PELVIC PRESSURE IN FEMALE: Primary | ICD-10-CM

## 2020-08-26 DIAGNOSIS — N81.9 FEMALE GENITAL PROLAPSE, UNSPECIFIED TYPE: ICD-10-CM

## 2020-08-26 DIAGNOSIS — G43.709 CHRONIC MIGRAINE WITHOUT AURA WITHOUT STATUS MIGRAINOSUS, NOT INTRACTABLE: ICD-10-CM

## 2020-08-26 LAB
BILIRUBIN: NEGATIVE
GLUCOSE (URINE DIPSTICK): NEGATIVE MG/DL
KETONES (URINE DIPSTICK): NEGATIVE MG/DL
MULTISTIX LOT#: NORMAL NUMERIC
NITRITE, URINE: NEGATIVE
PH, URINE: 5.5 (ref 4.5–8)
PROTEIN (URINE DIPSTICK): NEGATIVE MG/DL
SPECIFIC GRAVITY: 1.02 (ref 1–1.03)
UROBILINOGEN,SEMI-QN: 0.2 MG/DL (ref 0–1.9)

## 2020-08-26 PROCEDURE — 3008F BODY MASS INDEX DOCD: CPT | Performed by: FAMILY MEDICINE

## 2020-08-26 PROCEDURE — 87086 URINE CULTURE/COLONY COUNT: CPT | Performed by: FAMILY MEDICINE

## 2020-08-26 PROCEDURE — 99214 OFFICE O/P EST MOD 30 MIN: CPT | Performed by: FAMILY MEDICINE

## 2020-08-26 PROCEDURE — 3078F DIAST BP <80 MM HG: CPT | Performed by: FAMILY MEDICINE

## 2020-08-26 PROCEDURE — 3074F SYST BP LT 130 MM HG: CPT | Performed by: FAMILY MEDICINE

## 2020-08-26 PROCEDURE — 81003 URINALYSIS AUTO W/O SCOPE: CPT | Performed by: FAMILY MEDICINE

## 2020-08-26 RX ORDER — TRAZODONE HYDROCHLORIDE 50 MG/1
75 TABLET ORAL NIGHTLY
Qty: 45 TABLET | Refills: 0 | Status: CANCELLED | OUTPATIENT
Start: 2020-08-26

## 2020-08-26 NOTE — PROGRESS NOTES
Mariana Jones is a 23year old female. HPI:   Patient presents with symptoms of pelvic pressure, f/u on migraine headaches and recent MRI results. Had been having dysuria and urinary frequency after her last visit with me that has since resolved.  States nightly.  ) 45 tablet 0   • Ibuprofen (MIDOL) 200 MG Oral Cap Take 600 mg by mouth 2 (two) times daily as needed. • Ondansetron HCl (ZOFRAN) 4 mg tablet Take 1 tablet (4 mg total) by mouth every 8 (eight) hours as needed for Nausea.  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 A followup exam coubd be obtained to assess for stability  - MRI brain w/ &

## 2020-08-26 NOTE — PATIENT INSTRUCTIONS
Pelvic Organ Prolapse  Pelvic organ prolapse is when 1 or more organs inside the pelvis slip from their normal places. The pelvis is found between the waist and thighs.  Normally, muscles and tissues in the pelvic region support the pelvic organs and hold your healthcare professional's instructions. Pineal Cyst  A pineal is a type of brain cyst. A brain cyst is an abnormal fluid-filled sac in the brain. A cyst in the brain may contain cerebrospinal fluid (CSF).  CSF normally bathes and cushions the br treatments:   · Endoscopic removal. This is surgery through a small tube called an endoscope. · Stereotactic aspiration. This is the removal of fluid from the cyst with a needle. Imaging is used to help guide the needle.   Your healthcare provider will clarisa

## 2020-09-04 ENCOUNTER — MED REC SCAN ONLY (OUTPATIENT)
Dept: FAMILY MEDICINE CLINIC | Facility: CLINIC | Age: 25
End: 2020-09-04

## 2020-09-05 ENCOUNTER — PATIENT MESSAGE (OUTPATIENT)
Dept: FAMILY MEDICINE CLINIC | Facility: CLINIC | Age: 25
End: 2020-09-05

## 2020-09-05 DIAGNOSIS — G43.709 CHRONIC MIGRAINE WITHOUT AURA WITHOUT STATUS MIGRAINOSUS, NOT INTRACTABLE: ICD-10-CM

## 2020-09-08 RX ORDER — BUTALBITAL, ACETAMINOPHEN AND CAFFEINE 50; 325; 40 MG/1; MG/1; MG/1
1 TABLET ORAL EVERY 6 HOURS PRN
Qty: 30 TABLET | Refills: 0 | Status: SHIPPED | OUTPATIENT
Start: 2020-09-08 | End: 2021-03-13

## 2020-09-08 NOTE — TELEPHONE ENCOUNTER
From: Dex Mckay  To: Lesvia Mckeon DO  Sent: 9/5/2020 10:47 PM CDT  Subject: Prescription Question    Nishant. It's caron riley. I was wondering if you would please put in a refill for my migraine medicine.  I am two pills away from being out and am s

## 2020-09-21 ENCOUNTER — OFFICE VISIT (OUTPATIENT)
Dept: NEUROLOGY | Facility: CLINIC | Age: 25
End: 2020-09-21
Payer: COMMERCIAL

## 2020-09-21 VITALS
SYSTOLIC BLOOD PRESSURE: 118 MMHG | DIASTOLIC BLOOD PRESSURE: 70 MMHG | WEIGHT: 182 LBS | RESPIRATION RATE: 16 BRPM | BODY MASS INDEX: 28 KG/M2 | HEART RATE: 80 BPM

## 2020-09-21 DIAGNOSIS — R51.9 CHRONIC DAILY HEADACHE: ICD-10-CM

## 2020-09-21 DIAGNOSIS — G43.009 MIGRAINE WITHOUT AURA AND WITHOUT STATUS MIGRAINOSUS, NOT INTRACTABLE: ICD-10-CM

## 2020-09-21 DIAGNOSIS — E34.8 PINEAL GLAND CYST: ICD-10-CM

## 2020-09-21 PROCEDURE — 99204 OFFICE O/P NEW MOD 45 MIN: CPT | Performed by: OTHER

## 2020-09-21 PROCEDURE — 3074F SYST BP LT 130 MM HG: CPT | Performed by: OTHER

## 2020-09-21 PROCEDURE — 3078F DIAST BP <80 MM HG: CPT | Performed by: OTHER

## 2020-09-21 RX ORDER — TOPIRAMATE 25 MG/1
50 CAPSULE, COATED PELLETS ORAL DAILY
Qty: 60 CAPSULE | Refills: 2 | Status: SHIPPED | OUTPATIENT
Start: 2020-09-21 | End: 2021-01-18

## 2020-09-21 RX ORDER — UBROGEPANT 50 MG/1
TABLET ORAL
Qty: 10 TABLET | Refills: 2 | Status: SHIPPED | OUTPATIENT
Start: 2020-09-21 | End: 2020-10-21

## 2020-09-21 NOTE — PROGRESS NOTES
HPI:    Patient ID: Yazmin Rodriges is a 23year old female. PCP: Dr Mini Jernigan    Thank you for requesting this consultation for us. Below is the summary of my evaluation.     Migraine     Ezequiel Ha is a 23year old female with history of depression and anx Never Smoker      Smokeless tobacco: Never Used    Alcohol use:  Yes      Alcohol/week: 0.0 standard drinks      Frequency: Monthly or less      Drinks per session: 1 or 2      Comment: rarely    Drug use: No         Review of Systems   Constitutional: Nahun Chamberlain Take 600 mg by mouth 2 (two) times daily as needed.        Allergies:  Pollen                  OTHER (SEE COMMENTS)    Comment:Stuffy nose  Grass                   RASH  Molds & Smuts           Coughing   PHYSICAL EXAM:   Physical Exam  Blood pressure 118/7 Pineal gland cyst less likely contributing to her headaches.  MRI brain w and wo contrast obtained shows mild interval increase in size of the cyst when compared to the MRI brain done 20 years ago    Plan: Observation for Pineal gland cyst. Will repeat MRI

## 2020-09-22 ENCOUNTER — TELEPHONE (OUTPATIENT)
Dept: NEUROLOGY | Facility: CLINIC | Age: 25
End: 2020-09-22

## 2020-09-22 DIAGNOSIS — G43.009 MIGRAINE WITHOUT AURA AND WITHOUT STATUS MIGRAINOSUS, NOT INTRACTABLE: ICD-10-CM

## 2020-09-22 DIAGNOSIS — R51.9 CHRONIC DAILY HEADACHE: Primary | ICD-10-CM

## 2020-09-28 NOTE — TELEPHONE ENCOUNTER
Martha denied this request    Patient needs to try and fail one triptan(example: Almotriptan,Eletriptan,Frovatriptan,Naratriptan,Rizatriptan,Sumatriptan,Zolmitriptan)    Provider can call [phone] to speak with someone or additional clinicals can be fax

## 2020-09-30 NOTE — TELEPHONE ENCOUNTER
Called patient to discuss denial.  Per patient, she does have savings card. Instructed patient to use [employer] savings card and update office if she has any issues picking up medication. VU and denies any further needs.

## 2020-10-05 ENCOUNTER — TELEPHONE (OUTPATIENT)
Dept: NEUROLOGY | Facility: CLINIC | Age: 25
End: 2020-10-05

## 2020-10-05 NOTE — TELEPHONE ENCOUNTER
pt states her mother would like her to try alternative treatments before starting Topomax; pt will schedule f/u after she begins with the medication

## 2020-11-23 ENCOUNTER — OFFICE VISIT (OUTPATIENT)
Dept: FAMILY MEDICINE CLINIC | Facility: CLINIC | Age: 25
End: 2020-11-23
Payer: COMMERCIAL

## 2020-11-23 ENCOUNTER — TELEPHONE (OUTPATIENT)
Dept: FAMILY MEDICINE CLINIC | Facility: CLINIC | Age: 25
End: 2020-11-23

## 2020-11-23 VITALS
WEIGHT: 184.38 LBS | TEMPERATURE: 98 F | OXYGEN SATURATION: 99 % | SYSTOLIC BLOOD PRESSURE: 114 MMHG | BODY MASS INDEX: 28.27 KG/M2 | DIASTOLIC BLOOD PRESSURE: 72 MMHG | HEIGHT: 67.72 IN | RESPIRATION RATE: 16 BRPM | HEART RATE: 80 BPM

## 2020-11-23 DIAGNOSIS — H60.332 ACUTE SWIMMER'S EAR OF LEFT SIDE: Primary | ICD-10-CM

## 2020-11-23 PROCEDURE — 3074F SYST BP LT 130 MM HG: CPT | Performed by: FAMILY MEDICINE

## 2020-11-23 PROCEDURE — 3078F DIAST BP <80 MM HG: CPT | Performed by: FAMILY MEDICINE

## 2020-11-23 PROCEDURE — 3008F BODY MASS INDEX DOCD: CPT | Performed by: FAMILY MEDICINE

## 2020-11-23 PROCEDURE — 99212 OFFICE O/P EST SF 10 MIN: CPT | Performed by: FAMILY MEDICINE

## 2020-11-23 RX ORDER — OFLOXACIN 3 MG/ML
5 SOLUTION AURICULAR (OTIC) 2 TIMES DAILY
Qty: 10 ML | Refills: 1 | Status: SHIPPED | OUTPATIENT
Start: 2020-11-23 | End: 2020-11-30

## 2020-11-23 NOTE — TELEPHONE ENCOUNTER
Called patient who  has fullness, pressure, burning and wax build up in her ear.  has this often and wanted Dr. Allan Walls to look at it. [facility] is tested for covid at work 2x per week. Last test negative.  Reviewed potential covid symptoms and i

## 2020-11-23 NOTE — PATIENT INSTRUCTIONS
Ofloxacin 5 drops twice a day in both ears for 7 days. To prevent infection after finishing drops: try Vosol over the counter (vinegar solution that makes ear canal more acid and resistant to infection.

## 2020-11-23 NOTE — PROGRESS NOTES
Alex Mercer IS A 23year old female HERE FOR Patient presents with:  Ear Pain       History of present illness:     Feels burning in left ear canal, deep. Had many episodes of swimmer's ear in childhood.  Doesn't have good memory of whether treatment was 0   • lamoTRIgine 25 MG Oral Tab Take 2 tablets (50 mg total) by mouth daily. take at bedtime 30 tablet 2   • buPROPion HCl ER, XL, 150 MG Oral Tablet 24 Hr Take 1 tablet (150 mg total) by mouth daily.  30 tablet 0   • buPROPion HCl ER, XL, 300 MG Oral Tabl Stress: Not on file    Relationships      Social connections        Talks on phone: Not on file        Gets together: Not on file        Attends Zoroastrian service: Not on file        Active member of club or organization: Not on file        Attends meetin side L>R     Other orders  -     ofloxacin 0.3 % Otic Solution; Place 5 drops into both ears 2 (two) times daily for 7 days. Patient Instructions   Ofloxacin 5 drops twice a day in both ears for 7 days.     To prevent infection after finishing drop

## 2020-11-23 NOTE — TELEPHONE ENCOUNTER
Detailed VMML requesting patient call office asap for symptom detail and to reschedule appt. Informed Dr. Ant Gilbert will not be in the office this am. Office number given. [employer] message also sent to patient with request to call asap.

## 2021-01-14 NOTE — TELEPHONE ENCOUNTER
Pt calling asking if Dr. Dick Webber will write a new paper script for her birth control. Junel 1/20?

## 2021-01-15 RX ORDER — NORETHINDRONE ACETATE AND ETHINYL ESTRADIOL 1MG-20(21)
1 KIT ORAL DAILY
Qty: 3 PACKAGE | Refills: 4 | Status: CANCELLED | OUTPATIENT
Start: 2021-01-15 | End: 2022-01-15

## 2021-01-18 ENCOUNTER — OFFICE VISIT (OUTPATIENT)
Dept: FAMILY MEDICINE CLINIC | Facility: CLINIC | Age: 26
End: 2021-01-18
Payer: COMMERCIAL

## 2021-01-18 VITALS
HEART RATE: 80 BPM | OXYGEN SATURATION: 100 % | SYSTOLIC BLOOD PRESSURE: 110 MMHG | BODY MASS INDEX: 27.5 KG/M2 | DIASTOLIC BLOOD PRESSURE: 62 MMHG | HEIGHT: 67.72 IN | RESPIRATION RATE: 16 BRPM | TEMPERATURE: 98 F | WEIGHT: 179.38 LBS

## 2021-01-18 DIAGNOSIS — Z30.011 ENCOUNTER FOR INITIAL PRESCRIPTION OF CONTRACEPTIVE PILLS: Primary | ICD-10-CM

## 2021-01-18 DIAGNOSIS — N94.6 DYSMENORRHEA: ICD-10-CM

## 2021-01-18 DIAGNOSIS — Z91.81 HISTORY OF RECENT FALL: ICD-10-CM

## 2021-01-18 DIAGNOSIS — S09.90XA INJURY OF HEAD, INITIAL ENCOUNTER: ICD-10-CM

## 2021-01-18 LAB
CONTROL LINE PRESENT WITH A CLEAR BACKGROUND (YES/NO): YES YES/NO
PREGNANCY TEST, URINE: NEGATIVE

## 2021-01-18 PROCEDURE — 99214 OFFICE O/P EST MOD 30 MIN: CPT | Performed by: FAMILY MEDICINE

## 2021-01-18 PROCEDURE — 81025 URINE PREGNANCY TEST: CPT | Performed by: FAMILY MEDICINE

## 2021-01-18 PROCEDURE — 3074F SYST BP LT 130 MM HG: CPT | Performed by: FAMILY MEDICINE

## 2021-01-18 PROCEDURE — 3008F BODY MASS INDEX DOCD: CPT | Performed by: FAMILY MEDICINE

## 2021-01-18 PROCEDURE — 3078F DIAST BP <80 MM HG: CPT | Performed by: FAMILY MEDICINE

## 2021-01-18 RX ORDER — NORETHINDRONE ACETATE AND ETHINYL ESTRADIOL 1MG-20(21)
1 KIT ORAL DAILY
Qty: 1 PACKAGE | Refills: 0 | Status: SHIPPED | OUTPATIENT
Start: 2021-01-18 | End: 2021-02-16

## 2021-01-18 NOTE — PATIENT INSTRUCTIONS
Self-Care for Headaches  Most headaches aren't serious and can be relieved with self-care. But some headaches may be a sign of another health problem like eye trouble or high blood pressure.  To find the best treatment, learn what kind of headaches you ge end  · Headache after an activity such as driving or working on a computer  Signs of migraine  Any of the following can be signs:   · Throbbing pain on one or both sides of your head  · Nausea or vomiting  · Extreme sensitivity to light, sound, and smells at the time of the injury. Sometimes the symptoms of a mild TBI are much like those of a more severe TBI. Because every brain is different, it can be hard to predict exactly what your symptoms or your specific recovery will be like.   Diagnosing a mild TBI will depend on how quickly your own brain is healing. Don't return to sports or any activity that could cause you to hit your head until all symptoms are gone and you have been cleared by your doctor.  A second head injury before fully recovering from the

## 2021-01-18 NOTE — PROGRESS NOTES
HPI:    Patient ID: Migdalia Granger is a 23year old female. HPI   25yr old female presents requesting refills on her OCP and head injury. States she had been prescribed OCPs by her gyne last year to help with her painful, heavy periods.  Has been off i Take 1 tablet (150 mg total) by mouth daily. 30 tablet 0   • buPROPion HCl ER, XL, 300 MG Oral Tablet 24 Hr Take 1 tablet (300 mg total) by mouth daily.  30 tablet 0   • Albuterol Sulfate  (90 Base) MCG/ACT Inhalation Aero Soln Inhale into the lungs 1 tablet by mouth daily. Dispense: 1 Package; Refill: 0    3. Injury of head, initial encounter  4.  History of recent fall  - neuro exam wnl  - instructed pt to avoid alcohol use given recent fall and also due to her chronic meds  - monitor for any concer

## 2021-02-02 ENCOUNTER — PATIENT MESSAGE (OUTPATIENT)
Dept: FAMILY MEDICINE CLINIC | Facility: CLINIC | Age: 26
End: 2021-02-02

## 2021-02-03 NOTE — TELEPHONE ENCOUNTER
From: Negra Capellan  To: Hi Sibley DO  Sent: 2/2/2021 1:52 AM CST  Subject: Prescription Question    Hello,   When I filled up my personal weekly pill organizer, I accidentally put 200mg in my pm slot for today vs 100mg. I changed everything tonight.

## 2021-02-03 NOTE — TELEPHONE ENCOUNTER
Pls inform pt that she should still contact her psychiatrist for further advice about medication. I do not prescribe these meds and would prefer she follow psychiatrist recommendations.

## 2021-02-08 DIAGNOSIS — G43.009 MIGRAINE WITHOUT AURA AND WITHOUT STATUS MIGRAINOSUS, NOT INTRACTABLE: Primary | ICD-10-CM

## 2021-02-08 RX ORDER — TOPIRAMATE 25 MG/1
CAPSULE, COATED PELLETS ORAL
Qty: 180 CAPSULE | Refills: 0 | OUTPATIENT
Start: 2021-02-08

## 2021-02-08 NOTE — TELEPHONE ENCOUNTER
Due for appt. Rx was also discontinued at recent visit with another provider. [facility] message sent.      Medication: Topiramate     Date of last refill: 9/21/2020 for #60/2 additional refills  [place]    Last office v

## 2021-02-14 DIAGNOSIS — Z30.011 ENCOUNTER FOR INITIAL PRESCRIPTION OF CONTRACEPTIVE PILLS: ICD-10-CM

## 2021-02-14 DIAGNOSIS — N94.6 DYSMENORRHEA: ICD-10-CM

## 2021-02-15 NOTE — TELEPHONE ENCOUNTER
Gynecology Medication Protocol [id number]/14/2021 12:03 AM   PASS-PENDING LAST PAP WNL--VIA MANUAL LOOKUP    Physical or Pelvic/Breast in past 12 or next 3 mos--VIA MANUAL LOOKUP     LOV 1/18/21      LAST LAB n/a     LAST RX  1/18/21 1 package     Next OV No

## 2021-02-16 RX ORDER — NORETHINDRONE ACETATE/ETHINYL ESTRADIOL AND FERROUS FUMARATE 1MG-20(21)
KIT ORAL
Qty: 28 TABLET | Refills: 0 | Status: SHIPPED | OUTPATIENT
Start: 2021-02-16 | End: 2021-03-13

## 2021-02-17 ENCOUNTER — TELEPHONE (OUTPATIENT)
Dept: FAMILY MEDICINE CLINIC | Facility: CLINIC | Age: 26
End: 2021-02-17

## 2021-02-17 NOTE — TELEPHONE ENCOUNTER
Noris LUNDBERG 1/20 1-20 MG-MCG Oral Tab 28 tablet 0 2/16/2021     Sig: Take 1 tablet by mouth daily    Sent to pharmacy as: Suad Lundberg 1/20 1-20 MG-MCG Oral Tablet (Norethin Ace-Eth Estrad-FE)    E-Prescribing Status: Receipt confirmed by pharmacy (2/16/2

## 2021-02-17 NOTE — TELEPHONE ENCOUNTER
Pt called - scheduled her WWE for 3-13-21. She is asking for Dr to give her a month supply of the birth control to hold her over to the scheduled appointment.

## 2021-03-10 ENCOUNTER — TELEPHONE (OUTPATIENT)
Dept: FAMILY MEDICINE CLINIC | Facility: CLINIC | Age: 26
End: 2021-03-10

## 2021-03-10 NOTE — TELEPHONE ENCOUNTER
Pt calling stating that she will be out of her medication on Sunday. She just wants to make sure she will have it in time. Pt is scheduled for OV this Saturday. Please advise.     Future Appointments   Date Time Provider Yousif Ryan   3/13/2021 11:20

## 2021-03-10 NOTE — TELEPHONE ENCOUNTER
Called patient and verified she picked up the OCP rx written 2/16/21. She will be out of medication on Dragan 3/14/21. Advised Dr. Corrine Pantoja can give her updated rx at [address] on Saturday 3/13/21. Asking if she is on her period will PAP be done.   Advised won't

## 2021-03-13 ENCOUNTER — OFFICE VISIT (OUTPATIENT)
Dept: FAMILY MEDICINE CLINIC | Facility: CLINIC | Age: 26
End: 2021-03-13
Payer: COMMERCIAL

## 2021-03-13 VITALS
WEIGHT: 171.5 LBS | BODY MASS INDEX: 26.29 KG/M2 | OXYGEN SATURATION: 97 % | HEIGHT: 67.72 IN | TEMPERATURE: 98 F | DIASTOLIC BLOOD PRESSURE: 70 MMHG | HEART RATE: 87 BPM | SYSTOLIC BLOOD PRESSURE: 110 MMHG | RESPIRATION RATE: 16 BRPM

## 2021-03-13 DIAGNOSIS — R11.0 NAUSEA: ICD-10-CM

## 2021-03-13 DIAGNOSIS — J45.909 ASTHMA DUE TO ENVIRONMENTAL ALLERGIES: ICD-10-CM

## 2021-03-13 DIAGNOSIS — Z00.00 LABORATORY EXAM ORDERED AS PART OF ROUTINE GENERAL MEDICAL EXAMINATION: ICD-10-CM

## 2021-03-13 DIAGNOSIS — N63.20 BREAST MASS, LEFT: ICD-10-CM

## 2021-03-13 DIAGNOSIS — Z30.41 ENCOUNTER FOR SURVEILLANCE OF CONTRACEPTIVE PILLS: ICD-10-CM

## 2021-03-13 DIAGNOSIS — G43.709 CHRONIC MIGRAINE WITHOUT AURA WITHOUT STATUS MIGRAINOSUS, NOT INTRACTABLE: ICD-10-CM

## 2021-03-13 DIAGNOSIS — Z01.419 WELL WOMAN EXAM WITH ROUTINE GYNECOLOGICAL EXAM: Primary | ICD-10-CM

## 2021-03-13 DIAGNOSIS — E55.9 VITAMIN D INSUFFICIENCY: ICD-10-CM

## 2021-03-13 DIAGNOSIS — N94.6 DYSMENORRHEA: ICD-10-CM

## 2021-03-13 PROCEDURE — 87491 CHLMYD TRACH DNA AMP PROBE: CPT | Performed by: FAMILY MEDICINE

## 2021-03-13 PROCEDURE — 88175 CYTOPATH C/V AUTO FLUID REDO: CPT | Performed by: FAMILY MEDICINE

## 2021-03-13 PROCEDURE — 3074F SYST BP LT 130 MM HG: CPT | Performed by: FAMILY MEDICINE

## 2021-03-13 PROCEDURE — 99395 PREV VISIT EST AGE 18-39: CPT | Performed by: FAMILY MEDICINE

## 2021-03-13 PROCEDURE — 99214 OFFICE O/P EST MOD 30 MIN: CPT | Performed by: FAMILY MEDICINE

## 2021-03-13 PROCEDURE — 3078F DIAST BP <80 MM HG: CPT | Performed by: FAMILY MEDICINE

## 2021-03-13 PROCEDURE — 3008F BODY MASS INDEX DOCD: CPT | Performed by: FAMILY MEDICINE

## 2021-03-13 PROCEDURE — 87591 N.GONORRHOEAE DNA AMP PROB: CPT | Performed by: FAMILY MEDICINE

## 2021-03-13 RX ORDER — NORETHINDRONE ACETATE AND ETHINYL ESTRADIOL 1MG-20(21)
1 KIT ORAL DAILY
Qty: 84 TABLET | Refills: 2 | Status: SHIPPED | OUTPATIENT
Start: 2021-03-13

## 2021-03-13 RX ORDER — ONDANSETRON 4 MG/1
4 TABLET, FILM COATED ORAL EVERY 8 HOURS PRN
Qty: 20 TABLET | Refills: 0 | Status: SHIPPED | OUTPATIENT
Start: 2021-03-13 | End: 2021-06-29

## 2021-03-13 RX ORDER — BUTALBITAL, ACETAMINOPHEN AND CAFFEINE 50; 325; 40 MG/1; MG/1; MG/1
1 TABLET ORAL EVERY 6 HOURS PRN
Qty: 30 TABLET | Refills: 0 | Status: SHIPPED | OUTPATIENT
Start: 2021-03-13 | End: 2021-06-29

## 2021-03-13 RX ORDER — ALBUTEROL SULFATE 90 UG/1
2 AEROSOL, METERED RESPIRATORY (INHALATION) EVERY 6 HOURS PRN
Qty: 18 G | Refills: 2 | Status: SHIPPED | OUTPATIENT
Start: 2021-03-13

## 2021-03-13 NOTE — PATIENT INSTRUCTIONS
Controlling Asthma Triggers: Allergens    For many people with lung problems such as asthma or COPD, breathing in allergens leads to inflamed airways. Allergens also cause other types of reactions in some people.  For example, a runny nose, itchy, watery cages as much as possible. Mold  Mold grows in damp places, such as bathrooms, basements, and closets. It can grow anywhere flooding or a fire has caused water damage. Mold can live behind the walls if there has been water damage.   · Clean damp areas week Self-Awareness  What is breast self-awareness? Breast self-awareness is knowing how your breasts normally look and feel. Your breasts change as you go through different stages of your life. So it’s important to learn what is normal for your breasts.  Joshua Martinez most breast lumps are benign. This means they are not cancer. Ernst last reviewed this educational content on 5/1/2020  © 7375-6051 [employer]. All rights reserved.  This information is not intended as a substitute for professional medic exams   Diabetes mellitus, type 2  Adults with no symptoms who are overweight or obese and have 1 or more other risk factors for diabetes  At least every 3 years.  Also, testing for diabetes during pregnancy after the 24th week.     Gonorrhea Sexually activ 2 doses   Meningococcal Women at increased risk for infection should talk with their healthcare provider  1 or more doses   Pneumococcal conjugate vaccine (PCV13) and pneumococcal polysaccharide vaccine (PPSV23)  Women at increased risk for infection kelly those younger or older people at increased risk. The CDC recommends that everyone between the ages of 15 and 59 get tested for HIV at least once as part of routine health care.    Ernst last reviewed this educational content on 10/1/2017  © 1706-9552 The

## 2021-03-13 NOTE — PROGRESS NOTES
Patient presents with:  Physical: pap      HPI:  25yr old female presents for her WWE. Menses have been regular with OCPs, no SEs noted. LMP 3/8. Due for refills. Requesting refills on her fioricet, zofran and albuterol. Symptoms all stable.  Environmenta History   Problem Relation Age of Onset   • Breast Cancer Mother 39        BRCA negative   • Breast Cancer Paternal Grandmother    • Lung Disorder Paternal Grandmother    • Colon Cancer Other         MGGM   • Other (Oral Cancer) Maternal Grandfather    • D OTHER (SEE COMMENTS)    Comment:Stuffy nose  Grass                   RASH  Molds & Smuts           Coughing      Physical Exam  /70   Pulse 87   Temp 97.8 °F (36.6 °C) (Temporal)   Resp 16   Ht 5' 7.72\" (1.72 m)   Wt 171 lb 8 oz (77.8 kg) months & older 0.5 ml Prefilled syringe (77570)                          09/26/2019      HEP A,Ped/Adol,(2 Dose)                          08/11/2015  10/01/2016      HEP B, Ped/Adol       08/09/1997  10/02/1997  05/07/1998      HIB (3 Dose)          08/09/ of 50. Yearly FOBT, sigmoidoscopy every 5 years, or colonoscopy every 10 years.        A/P:    Well woman exam with routine gynecological exam  (primary encounter diagnosis)  Laboratory exam ordered as part of routine general medical examination  Breast ma Imaging & Consults:  US BREAST BILATERAL COMPLETE ([id number]=[id number])      No follow-ups on file.

## 2021-03-15 LAB
C TRACH DNA SPEC QL NAA+PROBE: NEGATIVE
N GONORRHOEA DNA SPEC QL NAA+PROBE: NEGATIVE

## 2021-03-29 ENCOUNTER — LAB ENCOUNTER (OUTPATIENT)
Dept: LAB | Age: 26
End: 2021-03-29
Attending: FAMILY MEDICINE
Payer: COMMERCIAL

## 2021-03-29 DIAGNOSIS — E55.9 VITAMIN D INSUFFICIENCY: ICD-10-CM

## 2021-03-29 DIAGNOSIS — Z00.00 LABORATORY EXAM ORDERED AS PART OF ROUTINE GENERAL MEDICAL EXAMINATION: ICD-10-CM

## 2021-03-29 PROCEDURE — 80061 LIPID PANEL: CPT

## 2021-03-29 PROCEDURE — 82306 VITAMIN D 25 HYDROXY: CPT

## 2021-03-29 PROCEDURE — 85025 COMPLETE CBC W/AUTO DIFF WBC: CPT

## 2021-03-29 PROCEDURE — 84443 ASSAY THYROID STIM HORMONE: CPT

## 2021-03-29 PROCEDURE — 36415 COLL VENOUS BLD VENIPUNCTURE: CPT

## 2021-03-29 PROCEDURE — 80053 COMPREHEN METABOLIC PANEL: CPT

## 2021-04-20 ENCOUNTER — OFFICE VISIT (OUTPATIENT)
Dept: FAMILY MEDICINE CLINIC | Facility: CLINIC | Age: 26
End: 2021-04-20
Payer: COMMERCIAL

## 2021-04-20 VITALS
DIASTOLIC BLOOD PRESSURE: 64 MMHG | BODY MASS INDEX: 25.78 KG/M2 | HEART RATE: 79 BPM | SYSTOLIC BLOOD PRESSURE: 110 MMHG | OXYGEN SATURATION: 98 % | WEIGHT: 168.13 LBS | HEIGHT: 67.72 IN | RESPIRATION RATE: 16 BRPM | TEMPERATURE: 97 F

## 2021-04-20 DIAGNOSIS — K59.09 CHRONIC CONSTIPATION: Primary | ICD-10-CM

## 2021-04-20 DIAGNOSIS — J45.909 ASTHMA DUE TO ENVIRONMENTAL ALLERGIES: ICD-10-CM

## 2021-04-20 PROCEDURE — 3008F BODY MASS INDEX DOCD: CPT | Performed by: FAMILY MEDICINE

## 2021-04-20 PROCEDURE — 3078F DIAST BP <80 MM HG: CPT | Performed by: FAMILY MEDICINE

## 2021-04-20 PROCEDURE — 3074F SYST BP LT 130 MM HG: CPT | Performed by: FAMILY MEDICINE

## 2021-04-20 PROCEDURE — 99214 OFFICE O/P EST MOD 30 MIN: CPT | Performed by: FAMILY MEDICINE

## 2021-04-20 RX ORDER — LINACLOTIDE 72 UG/1
1 CAPSULE, GELATIN COATED ORAL DAILY
Qty: 30 CAPSULE | Refills: 1 | Status: SHIPPED | OUTPATIENT
Start: 2021-04-20 | End: 2021-05-20

## 2021-04-20 NOTE — PATIENT INSTRUCTIONS
Treating Constipation    Constipation is a common and often uncomfortable problem. Constipation means you have bowel movements fewer than 3 times per week. Or that you strain to pass hard, dry stool. It can last a short time.  Or it can be a problem that intended as a substitute for professional medical care. Always follow your healthcare professional's instructions.

## 2021-04-20 NOTE — PROGRESS NOTES
HPI/Subjective:   Patient ID: Tara Antunez is a 23year old female. HPI   25yr old female presents with c/o chronic constipation since Dec. 2020. Denies any changes in diet/lifestyle. Had discontinued lexapro but no other changes in meds.  Has been jen tablet 0   • buPROPion HCl ER, XL, 300 MG Oral Tablet 24 Hr Take 1 tablet (300 mg total) by mouth daily.  30 tablet 0   • TRAZODONE HCL 50 MG Oral Tab TAKE 1.5 TABLETS (75 MG TOTAL) BY MOUTH NIGHTLY. (Patient taking differently: Take 100 mg by mouth nightly linaCLOtide (LINZESS) 72 MCG Oral Cap 30 capsule 1     Sig: Take 1 capsule by mouth daily.  Take >30min before first meal       Imaging & Referrals:  [facility] - INTERNAL

## 2021-04-23 ENCOUNTER — TELEPHONE (OUTPATIENT)
Dept: FAMILY MEDICINE CLINIC | Facility: CLINIC | Age: 26
End: 2021-04-23

## 2021-04-23 NOTE — TELEPHONE ENCOUNTER
Called Pharmacy, they state they have not received rx yet. Per Epic:  Prior authorization: Payer Waiting for Response    This order has not been released to its destination      Electronic Prior Authorization not supported. Submit via other methods.   PRIM

## 2021-04-23 NOTE — TELEPHONE ENCOUNTER
Pt left a vm stating she was seen by  4-20-21. Was prescribed a medication that she said it had to go thru insurance. Pt said she pays out of pocket for all her medications & uses [facility].   She said she called [facility] & they never received a script

## 2021-04-26 NOTE — TELEPHONE ENCOUNTER
Received response from [employer] that Kanona Genta is not covered by patient's insurance. Patient has indicated she is going to use Good[facility] and pay out of pocket for the medication.     Dr. Katie Sibley  [facility]

## 2021-06-28 DIAGNOSIS — R11.0 NAUSEA: ICD-10-CM

## 2021-06-28 DIAGNOSIS — G43.709 CHRONIC MIGRAINE WITHOUT AURA WITHOUT STATUS MIGRAINOSUS, NOT INTRACTABLE: ICD-10-CM

## 2021-06-28 NOTE — TELEPHONE ENCOUNTER
Name from pharmacy: Ondansetron Hydrochloride 4 Mg Tab [place]          Will file in chart as:  Ondansetron HCl (ZOFRAN) 4 mg tablet    Sig: TAKE ONE TABLET BY MOUTH EVERY EIGHT HOURS AS NEEDED FOR NAUSEA    Disp:  20 tablet    Refills:  0    Start: 6/28/2021

## 2021-06-29 RX ORDER — ONDANSETRON 4 MG/1
TABLET, FILM COATED ORAL
Qty: 20 TABLET | Refills: 0 | Status: SHIPPED | OUTPATIENT
Start: 2021-06-29

## 2021-06-29 RX ORDER — BUTALBITAL, ACETAMINOPHEN AND CAFFEINE 50; 325; 40 MG/1; MG/1; MG/1
TABLET ORAL
Qty: 30 TABLET | Refills: 0 | Status: SHIPPED | OUTPATIENT
Start: 2021-06-29 | End: 2021-09-02

## 2021-07-06 ENCOUNTER — TELEPHONE (OUTPATIENT)
Dept: FAMILY MEDICINE CLINIC | Facility: CLINIC | Age: 26
End: 2021-07-06

## 2021-07-06 NOTE — TELEPHONE ENCOUNTER
Patient called and informed us that she will be moving to [place] and will be looking for a new pcp  . Removal form filled  .

## 2021-09-02 ENCOUNTER — TELEPHONE (OUTPATIENT)
Dept: FAMILY MEDICINE CLINIC | Facility: CLINIC | Age: 26
End: 2021-09-02

## 2021-09-02 DIAGNOSIS — R11.0 NAUSEA: ICD-10-CM

## 2021-09-02 DIAGNOSIS — J45.909 ASTHMA DUE TO ENVIRONMENTAL ALLERGIES: ICD-10-CM

## 2021-09-02 DIAGNOSIS — G43.709 CHRONIC MIGRAINE WITHOUT AURA WITHOUT STATUS MIGRAINOSUS, NOT INTRACTABLE: ICD-10-CM

## 2021-09-02 RX ORDER — BUTALBITAL, ACETAMINOPHEN AND CAFFEINE 50; 325; 40 MG/1; MG/1; MG/1
TABLET ORAL
Qty: 30 TABLET | Refills: 0 | Status: CANCELLED | OUTPATIENT
Start: 2021-09-02

## 2021-09-02 RX ORDER — BUTALBITAL, ACETAMINOPHEN AND CAFFEINE 50; 325; 40 MG/1; MG/1; MG/1
TABLET ORAL
Qty: 30 TABLET | Refills: 0 | OUTPATIENT
Start: 2021-09-02

## 2021-09-02 RX ORDER — ONDANSETRON 4 MG/1
4 TABLET, FILM COATED ORAL EVERY 8 HOURS PRN
Qty: 20 TABLET | Refills: 0 | OUTPATIENT
Start: 2021-09-02

## 2021-09-02 RX ORDER — ONDANSETRON 4 MG/1
TABLET, FILM COATED ORAL
Qty: 20 TABLET | Refills: 0 | OUTPATIENT
Start: 2021-09-02

## 2021-09-02 RX ORDER — BUTALBITAL, ACETAMINOPHEN AND CAFFEINE 50; 325; 40 MG/1; MG/1; MG/1
TABLET ORAL
Qty: 30 TABLET | Refills: 0 | Status: SHIPPED | OUTPATIENT
Start: 2021-09-02

## 2021-09-02 RX ORDER — ALBUTEROL SULFATE 90 UG/1
2 AEROSOL, METERED RESPIRATORY (INHALATION) EVERY 6 HOURS PRN
Qty: 18 G | Refills: 2 | OUTPATIENT
Start: 2021-09-02

## 2021-09-02 NOTE — TELEPHONE ENCOUNTER
Cant give Rx to patient that lives in [place] . Per DR MERLY Cummings Needs to find a new provider .

## 2021-09-02 NOTE — TELEPHONE ENCOUNTER
Name from pharmacy: Butalbital/Acetaminophen/Caffeine 50-56-36 Mg Tab [place]         Will file in chart as: BUTALBITAL-ACETAMINOPHEN-CAFFEINE -40 MG Oral Tab    Sig: TAKE ONE TABLET BY MOUTH EVERY SIX HOURS AS NEEDED FOR PAIN OR HEADACHES    Disp:  3

## 2021-09-02 NOTE — TELEPHONE ENCOUNTER
med refills denied, she is having side effects with headaches and nausea  without medication. She is in town until [place]. UTI sx    moved to [place] no PCP yet.

## 2021-09-02 NOTE — TELEPHONE ENCOUNTER
Lm for pt (Emili Ferrer per HIPAA) to inform f/u on refill request, will forward message to Dr. Harlan Barnes, however, cannot guarantee as we have not seen pt since April and pt moved to [place]. Encouraged pt to establish with a new PCP as soon as possible.  For UTI, adv

## 2021-09-02 NOTE — TELEPHONE ENCOUNTER
Rx for fioricet sent as requested, no further refills possible. Will need to establish care with PCP in [place].    Pt will need to be seen for UTI tx at [facility], if no availability in office

## 2021-09-03 NOTE — TELEPHONE ENCOUNTER
Called and gave patient instructions and advise per Dr. Jessica Waters note. [facility] hours given to patient. She is in [place] for her sisters wedding.

## 2021-10-20 DIAGNOSIS — N94.6 DYSMENORRHEA: ICD-10-CM

## 2021-10-20 RX ORDER — NORETHINDRONE ACETATE/ETHINYL ESTRADIOL AND FERROUS FUMARATE 1MG-20(21)
KIT ORAL
Qty: 84 TABLET | Refills: 0 | OUTPATIENT
Start: 2021-10-20

## 2021-10-20 NOTE — TELEPHONE ENCOUNTER
Gynecology Medication Protocol Failed 10/20/2021 12:08 AM    PASS-PENDING LAST PAP WNL--VIA MANUAL LOOKUP    Physical or Pelvic/Breast in past 12 or next 3 mos--VIA MANUAL LOOKUP     LOV    LAST LAB    LAST RX     Next OV No future appointments.       BHAVESH

## 2024-04-17 NOTE — TELEPHONE ENCOUNTER
Pls check on status, it should be getting better by now. If it continues to get bigger, she may need an I&D.  She may take tylenol/motrin prn pain What Type Of Note Output Would You Prefer (Optional)?: Bullet Format How Severe Is Your Rash?: severe Is This A New Presentation, Or A Follow-Up?: Rash

## (undated) NOTE — LETTER
08/27/18    Dear Ricky Hernandez, DO,    I am seeing Adonay Nurse in the office today for follow-up of her abdominal pain.               Assessment   Abdominal pain, unspecified abdominal location  (primary encounter diagnosis)    Plan   At this poin

## (undated) NOTE — LETTER
08/20/18    Dear Denny Genao DO,    I am seeing Jonah Burt in the office today after an ER visit for abdominal pain.          Assessment   Abdominal pain, unspecified abdominal location  (primary encounter diagnosis)  Lymphadenopathy, mesenter Patient can use Tylenol, Advil, or Aleve for pain control. If the patient begins having fever, severe constant pain, she should notify the office immediately. This may indicate appendicitis. Otherwise the patient should follow-up in 1 week.   If her

## (undated) NOTE — LETTER
04/12/21        Isis Mercer  [address]  [place] [address]      Dear Fernando Emery,    Our records indicate that you have outstanding lab work and or testing that was ordered for you and has not yet been completed:  Orders Placed This Encount

## (undated) NOTE — ED AVS SNAPSHOT
Kumar Orozco   MRN: [id number]    Department:  [facility] Emergency Department   Date of Visit:  8/19/2018           Disclosure     Insurance plans vary and the physician(s) referred by the ER may not be covered by your plan.  Please contact your tell this physician (or your personal doctor if your instructions are to return to your personal doctor) about any new or lasting problems. The primary care or specialist physician will see patients referred from the [facility] Emergency Department.  Con Jj

## (undated) NOTE — LETTER
Date & Time: 8/19/2018, 10:23 PM  Patient: Antonio Mercer  Encounter Provider(s):    Pb Montiel MD       To Whom It May Concern:    Gabriella Wright was seen and treated in our department on 8/19/2018. She may return to work 08/21/18.     If you have any qu

## (undated) NOTE — ED AVS SNAPSHOT
Giselashaynamiguel Caron   MRN: [id number]    Department:  [facility] Emergency Department   Date of Visit:  3/19/2019           Disclosure     Insurance plans vary and the physician(s) referred by the ER may not be covered by your plan.  Please contact your tell this physician (or your personal doctor if your instructions are to return to your personal doctor) about any new or lasting problems. The primary care or specialist physician will see patients referred from the [facility] Emergency Department.  Margot Ramirez

## (undated) NOTE — LETTER
July 13, 2020    Patient: Shakeel Maurer   YOB: 1995     Dear Employer,  At [facility], we are taking special precautions and doing everything we can to prevent the spread of COVID-19 (coronavirus disease 2019).  During this ti immunosuppressed status due to disease or medication, chronic respiratory or heart conditions and diabetes). ?  During the social distancing period imposed by the [place] in March, any employee who can be isolated at home and avoid exposure to th

## (undated) NOTE — LETTER
[employer] for Sergio Stout     : 1995     Date: 2021  Provider:  Frederick Auguste DO  Phone for doctor or clinic: [address] [place] [address], [address] [place] [address] [address] [facility] [id number] [phone] a

## (undated) NOTE — LETTER
Dear Employer,  At [facility], we are taking special precautions and doing everything we can to prevent the spread of COVID-19 (coronavirus disease 2019).  During this time, we ask for your assistance regarding physician documentation for empl